# Patient Record
Sex: MALE | Race: WHITE | Employment: FULL TIME | URBAN - METROPOLITAN AREA
[De-identification: names, ages, dates, MRNs, and addresses within clinical notes are randomized per-mention and may not be internally consistent; named-entity substitution may affect disease eponyms.]

---

## 2021-10-22 ENCOUNTER — APPOINTMENT (OUTPATIENT)
Dept: GENERAL RADIOLOGY | Age: 21
End: 2021-10-22

## 2021-10-22 ENCOUNTER — HOSPITAL ENCOUNTER (EMERGENCY)
Age: 21
Discharge: HOME | End: 2021-10-22
Attending: EMERGENCY MEDICINE

## 2021-10-22 VITALS
HEIGHT: 77 IN | DIASTOLIC BLOOD PRESSURE: 67 MMHG | TEMPERATURE: 97.5 F | SYSTOLIC BLOOD PRESSURE: 126 MMHG | BODY MASS INDEX: 19.48 KG/M2 | HEART RATE: 81 BPM | RESPIRATION RATE: 16 BRPM | OXYGEN SATURATION: 100 % | WEIGHT: 165 LBS

## 2021-10-22 DIAGNOSIS — Z23 COVID-19 VACCINE SERIES STARTED: ICD-10-CM

## 2021-10-22 DIAGNOSIS — R11.2 NON-INTRACTABLE VOMITING WITH NAUSEA, UNSPECIFIED VOMITING TYPE: Primary | ICD-10-CM

## 2021-10-22 LAB
A/G RATIO: 1.4 (ref 1.1–2.2)
ALBUMIN SERPL-MCNC: 5.1 G/DL (ref 3.4–5)
ALP BLD-CCNC: 124 U/L (ref 40–129)
ALT SERPL-CCNC: 17 U/L (ref 10–40)
ANION GAP SERPL CALCULATED.3IONS-SCNC: 12 MMOL/L (ref 3–16)
AST SERPL-CCNC: 24 U/L (ref 15–37)
BASOPHILS ABSOLUTE: 0 K/UL (ref 0–0.2)
BASOPHILS RELATIVE PERCENT: 0.1 %
BILIRUB SERPL-MCNC: 0.6 MG/DL (ref 0–1)
BUN BLDV-MCNC: 11 MG/DL (ref 7–20)
CALCIUM SERPL-MCNC: 10.5 MG/DL (ref 8.3–10.6)
CHLORIDE BLD-SCNC: 101 MMOL/L (ref 99–110)
CO2: 26 MMOL/L (ref 21–32)
CREAT SERPL-MCNC: 0.8 MG/DL (ref 0.9–1.3)
EOSINOPHILS ABSOLUTE: 0 K/UL (ref 0–0.6)
EOSINOPHILS RELATIVE PERCENT: 0.2 %
GFR AFRICAN AMERICAN: >60
GFR NON-AFRICAN AMERICAN: >60
GLOBULIN: 3.6 G/DL
GLUCOSE BLD-MCNC: 96 MG/DL (ref 70–99)
HCT VFR BLD CALC: 45.7 % (ref 40.5–52.5)
HEMOGLOBIN: 15.7 G/DL (ref 13.5–17.5)
INFLUENZA A: NOT DETECTED
INFLUENZA B: NOT DETECTED
LYMPHOCYTES ABSOLUTE: 1.8 K/UL (ref 1–5.1)
LYMPHOCYTES RELATIVE PERCENT: 17.1 %
MCH RBC QN AUTO: 30.4 PG (ref 26–34)
MCHC RBC AUTO-ENTMCNC: 34.3 G/DL (ref 31–36)
MCV RBC AUTO: 88.7 FL (ref 80–100)
MONOCYTES ABSOLUTE: 0.6 K/UL (ref 0–1.3)
MONOCYTES RELATIVE PERCENT: 5.8 %
NEUTROPHILS ABSOLUTE: 8 K/UL (ref 1.7–7.7)
NEUTROPHILS RELATIVE PERCENT: 76.8 %
PDW BLD-RTO: 12.9 % (ref 12.4–15.4)
PLATELET # BLD: 222 K/UL (ref 135–450)
PMV BLD AUTO: 7 FL (ref 5–10.5)
POTASSIUM REFLEX MAGNESIUM: 4.5 MMOL/L (ref 3.5–5.1)
RBC # BLD: 5.15 M/UL (ref 4.2–5.9)
SARS-COV-2 RNA, RT PCR: NOT DETECTED
SODIUM BLD-SCNC: 139 MMOL/L (ref 136–145)
TOTAL PROTEIN: 8.7 G/DL (ref 6.4–8.2)
TROPONIN: <0.01 NG/ML
WBC # BLD: 10.4 K/UL (ref 4–11)

## 2021-10-22 PROCEDURE — 96374 THER/PROPH/DIAG INJ IV PUSH: CPT

## 2021-10-22 PROCEDURE — 93005 ELECTROCARDIOGRAM TRACING: CPT | Performed by: EMERGENCY MEDICINE

## 2021-10-22 PROCEDURE — 2580000003 HC RX 258: Performed by: NURSE PRACTITIONER

## 2021-10-22 PROCEDURE — 87636 SARSCOV2 & INF A&B AMP PRB: CPT

## 2021-10-22 PROCEDURE — 80053 COMPREHEN METABOLIC PANEL: CPT

## 2021-10-22 PROCEDURE — 84484 ASSAY OF TROPONIN QUANT: CPT

## 2021-10-22 PROCEDURE — 99284 EMERGENCY DEPT VISIT MOD MDM: CPT

## 2021-10-22 PROCEDURE — 71046 X-RAY EXAM CHEST 2 VIEWS: CPT

## 2021-10-22 PROCEDURE — 85025 COMPLETE CBC W/AUTO DIFF WBC: CPT

## 2021-10-22 PROCEDURE — 6360000002 HC RX W HCPCS: Performed by: NURSE PRACTITIONER

## 2021-10-22 RX ORDER — ONDANSETRON 2 MG/ML
4 INJECTION INTRAMUSCULAR; INTRAVENOUS ONCE
Status: COMPLETED | OUTPATIENT
Start: 2021-10-22 | End: 2021-10-22

## 2021-10-22 RX ORDER — ONDANSETRON 4 MG/1
4 TABLET, FILM COATED ORAL EVERY 8 HOURS PRN
Qty: 20 TABLET | Refills: 0 | Status: SHIPPED | OUTPATIENT
Start: 2021-10-22 | End: 2022-05-06

## 2021-10-22 RX ORDER — 0.9 % SODIUM CHLORIDE 0.9 %
2000 INTRAVENOUS SOLUTION INTRAVENOUS ONCE
Status: COMPLETED | OUTPATIENT
Start: 2021-10-22 | End: 2021-10-22

## 2021-10-22 RX ADMIN — ONDANSETRON HYDROCHLORIDE 4 MG: 2 INJECTION, SOLUTION INTRAMUSCULAR; INTRAVENOUS at 20:59

## 2021-10-22 RX ADMIN — SODIUM CHLORIDE 2000 ML: 9 INJECTION, SOLUTION INTRAVENOUS at 20:58

## 2021-10-22 ASSESSMENT — PAIN DESCRIPTION - LOCATION: LOCATION: CHEST

## 2021-10-22 ASSESSMENT — ENCOUNTER SYMPTOMS
SORE THROAT: 0
VOMITING: 1
NAUSEA: 1
RHINORRHEA: 0
SHORTNESS OF BREATH: 1
COLOR CHANGE: 0
ABDOMINAL PAIN: 1
COUGH: 1

## 2021-10-22 ASSESSMENT — PAIN DESCRIPTION - FREQUENCY: FREQUENCY: INTERMITTENT

## 2021-10-22 ASSESSMENT — PAIN DESCRIPTION - DESCRIPTORS: DESCRIPTORS: PRESSURE;BURNING

## 2021-10-22 ASSESSMENT — PAIN DESCRIPTION - PAIN TYPE: TYPE: ACUTE PAIN

## 2021-10-22 ASSESSMENT — PAIN SCALES - GENERAL: PAINLEVEL_OUTOF10: 10

## 2021-10-22 ASSESSMENT — PAIN DESCRIPTION - ORIENTATION: ORIENTATION: MID

## 2021-10-22 NOTE — ED PROVIDER NOTES
I did not perform an evaluation of Jony Darden but was asked to review his EKG. EKG interpreted by myself.    Rate: normal  Rhythm: NSR  Axis: normal  Intervals: within normal limits  ST Segments: no acute abnormality  T waves: no acute abnormality  Comparison: no prior   Impression: NSR with no acute abnormality        Jolene Pike MD  10/22/21 2540

## 2021-10-23 LAB
EKG ATRIAL RATE: 87 BPM
EKG DIAGNOSIS: NORMAL
EKG P AXIS: 73 DEGREES
EKG P-R INTERVAL: 142 MS
EKG Q-T INTERVAL: 320 MS
EKG QRS DURATION: 84 MS
EKG QTC CALCULATION (BAZETT): 385 MS
EKG R AXIS: 75 DEGREES
EKG T AXIS: 62 DEGREES
EKG VENTRICULAR RATE: 87 BPM

## 2021-10-23 PROCEDURE — 93010 ELECTROCARDIOGRAM REPORT: CPT | Performed by: INTERNAL MEDICINE

## 2021-10-23 NOTE — ED PROVIDER NOTES
Negative for agitation. All other systems reviewed and are negative. Positives and Pertinent negatives as per HPI. Except as noted above in the ROS, all other systems were reviewed and negative. PAST MEDICAL HISTORY   History reviewed. No pertinent past medical history. SURGICAL HISTORY     History reviewed. No pertinent surgical history. CURRENT MEDICATIONS       Discharge Medication List as of 10/22/2021 10:34 PM            ALLERGIES     Mucinex d [pseudoephedrine-guaifenesin er], Amoxicillin, Penicillins, and Robitussin severe cgh-sr thrt [acetaminophen-dm]    FAMILY HISTORY     History reviewed. No pertinent family history. SOCIAL HISTORY       Social History     Socioeconomic History    Marital status: Single     Spouse name: None    Number of children: None    Years of education: None    Highest education level: None   Occupational History    None   Tobacco Use    Smoking status: Current Every Day Smoker     Packs/day: 1.00     Types: Cigarettes, E-Cigarettes    Smokeless tobacco: Never Used   Substance and Sexual Activity    Alcohol use: Yes     Comment: OCCASS    Drug use: Not Currently    Sexual activity: None   Other Topics Concern    None   Social History Narrative    None     Social Determinants of Health     Financial Resource Strain:     Difficulty of Paying Living Expenses:    Food Insecurity:     Worried About Running Out of Food in the Last Year:     Ran Out of Food in the Last Year:    Transportation Needs:     Lack of Transportation (Medical):      Lack of Transportation (Non-Medical):    Physical Activity:     Days of Exercise per Week:     Minutes of Exercise per Session:    Stress:     Feeling of Stress :    Social Connections:     Frequency of Communication with Friends and Family:     Frequency of Social Gatherings with Friends and Family:     Attends Judaism Services:     Active Member of Clubs or Organizations:     Attends Club or Organization Meetings:     Marital Status:    Intimate Partner Violence:     Fear of Current or Ex-Partner:     Emotionally Abused:     Physically Abused:     Sexually Abused:        SCREENINGS    [place] Coma Scale  Eye Opening: Spontaneous  Best Verbal Response: Oriented  Best Motor Response: Obeys commands  [place] Coma Scale Score: 15        PHYSICAL EXAM  (up to 7 for level 4, 8 or more for level 5)     ED Triage Vitals [10/22/21 1817]   BP Temp Temp Source Pulse Resp SpO2 Height Weight   (!) 142/85 97.5 °F (36.4 °C) Oral 85 18 99 % 6' 5\" (1.956 m) 165 lb (74.8 kg)       Physical Exam  Constitutional:       Appearance: He is well-developed. HENT:      Head: Normocephalic and atraumatic. Nose: Congestion present. Cardiovascular:      Rate and Rhythm: Normal rate. Pulmonary:      Effort: Pulmonary effort is normal. No respiratory distress. Breath sounds: Rales present. Abdominal:      General: There is no distension. Palpations: Abdomen is soft. Tenderness: There is generalized abdominal tenderness. Musculoskeletal:         General: Normal range of motion. Cervical back: Normal range of motion. Skin:     General: Skin is warm and dry. Neurological:      Mental Status: He is alert and oriented to person, place, and time.          DIAGNOSTIC RESULTS   LABS:    Labs Reviewed   CBC WITH AUTO DIFFERENTIAL - Abnormal; Notable for the following components:       Result Value    Neutrophils Absolute 8.0 (*)     All other components within normal limits    Narrative:     Performed at:  [facility] [address],  [place], [facility]   Phone [phone]   COMPREHENSIVE METABOLIC PANEL W/ REFLEX TO MG FOR LOW K - Abnormal; Notable for the following components:    CREATININE 0.8 (*)     Total Protein 8.7 (*)     Albumin 5.1 (*)     All other components within normal limits    Narrative:     Performed at:  [facility]  [address],  [place], [facility]   Phone [phone] & INFLUENZA COMBO    Narrative:     Performed at:  [facility]  [address],  [place], [facility]   Phone [phone]   TROPONIN    Narrative:     Performed at:  [facility]  [address],  [place], [facility]   Phone [phone]       All other labs werewithin normal range or not returned as of this dictation. EKG: All EKG's are interpreted by the Emergency Department Physician who either signs or Co-signs this chart in the absence of a cardiologist.  Please see their note for interpretation of EKG. RADIOLOGY:   Chest x-ray interpreted by radiologist for  FINDINGS:   The lungs are clear. There is no pleural effusion. The cardiomediastinal   silhouette is normal. There is no pneumothorax. Interpretation per the Radiologist below, if available at the time of this note:    XR CHEST (2 VW)   Final Result   No acute disease. No results found. PROCEDURES   Unless otherwise noted below, none     Procedures     CRITICAL CARE TIME   N/A    CONSULTS:  None      EMERGENCYDEPARTMENT COURSE and DIFFERENTIAL DIAGNOSIS/MDM:   Vitals:    Vitals:    10/22/21 1817 10/22/21 2007 10/22/21 2100 10/22/21 2200   BP: (!) 142/85 131/80 129/83 126/67   Pulse: 85 86 82 81   Resp: 18 16 16 16   Temp: 97.5 °F (36.4 °C)      TempSrc: Oral      SpO2: 99% 100% 100% 100%   Weight: 165 lb (74.8 kg)      Height: 6' 5\" (1.956 m)          Patient was given the following medications:  Medications   0.9 % sodium chloride bolus (0 mLs IntraVENous Stopped 10/22/21 2158)   ondansetron (ZOFRAN) injection 4 mg (4 mg IntraVENous Given 10/22/21 2059)       Patient was seen and evaluated by myself. Patient reports that he received the Covid vaccine on 10/17/2021. He reports that he woke up today not feeling well. He reports chills nausea and vomiting.   On exam he is awake and alert hemodynamically stable nontoxic in appearance. Lab has been reviewed and interpreted. Patient was provided with IV fluids and nausea medications. Patient was reevaluated and states that he does feel better. Patient was able to take oral intake without difficulty. Patient will be discharged with Zofran. Patient was encouraged to follow-up with her primary care doctor the next 2 days and return to the ED for worsening symptoms. Patient was ultimately discharged home with all questions answered. The patient tolerated their visit well. I have evaluated this patient. My supervising physician was available for consultation. The patient and / or the family were informed of the results of any tests, a time was given to answer questions, a plan was proposed and they agreed with plan. FINAL IMPRESSION      1. Non-intractable vomiting with nausea, unspecified vomiting type    2.  COVID-19 vaccine series started          DISPOSITION/PLAN   DISPOSITION Decision To Discharge 10/22/2021 10:22:34 PM      PATIENT REFERRED TO:  Deepak Jonybhavna  [phone]  Schedule an appointment as soon as possible for a visit in 1 week  to establish primary care    [facility] ED  [address] [facility]  [phone]    If symptoms worsen      DISCHARGE MEDICATIONS:  Discharge Medication List as of 10/22/2021 10:34 PM      START taking these medications    Details   ondansetron (ZOFRAN) 4 MG tablet Take 1 tablet by mouth every 8 hours as needed for Nausea, Disp-20 tablet, R-0Print             DISCONTINUED MEDICATIONS:  Discharge Medication List as of 10/22/2021 10:34 PM                 (Please note that portions of this note were completed with a voice recognition program.  Efforts were made to edit the dictations but occasionally words are mis-transcribed.)    MAXIME Lay CNP (electronically signed)         MAXIME Lay CNP  10/25/21 6780

## 2022-05-06 ENCOUNTER — HOSPITAL ENCOUNTER (EMERGENCY)
Age: 22
Discharge: HOME | End: 2022-05-07
Attending: EMERGENCY MEDICINE

## 2022-05-06 ENCOUNTER — APPOINTMENT (OUTPATIENT)
Dept: GENERAL RADIOLOGY | Age: 22
End: 2022-05-06

## 2022-05-06 DIAGNOSIS — R07.9 CHEST PAIN, UNSPECIFIED TYPE: Primary | ICD-10-CM

## 2022-05-06 DIAGNOSIS — Z86.59 HISTORY OF RECENT STRESSFUL LIFE EVENT: ICD-10-CM

## 2022-05-06 DIAGNOSIS — J02.9 SORE THROAT: ICD-10-CM

## 2022-05-06 DIAGNOSIS — R11.0 NAUSEA: ICD-10-CM

## 2022-05-06 DIAGNOSIS — R51.9 ACUTE NONINTRACTABLE HEADACHE, UNSPECIFIED HEADACHE TYPE: ICD-10-CM

## 2022-05-06 DIAGNOSIS — Z20.822 EXPOSURE TO COVID-19 VIRUS: ICD-10-CM

## 2022-05-06 DIAGNOSIS — R63.0 DECREASED APPETITE: ICD-10-CM

## 2022-05-06 DIAGNOSIS — R00.1 SINUS BRADYCARDIA: ICD-10-CM

## 2022-05-06 DIAGNOSIS — R00.2 PALPITATIONS: ICD-10-CM

## 2022-05-06 PROCEDURE — 71045 X-RAY EXAM CHEST 1 VIEW: CPT

## 2022-05-06 PROCEDURE — 93005 ELECTROCARDIOGRAM TRACING: CPT | Performed by: EMERGENCY MEDICINE

## 2022-05-06 PROCEDURE — 99285 EMERGENCY DEPT VISIT HI MDM: CPT

## 2022-05-06 ASSESSMENT — PAIN DESCRIPTION - ORIENTATION: ORIENTATION: MID

## 2022-05-06 ASSESSMENT — PAIN - FUNCTIONAL ASSESSMENT: PAIN_FUNCTIONAL_ASSESSMENT: 0-10

## 2022-05-06 ASSESSMENT — PAIN DESCRIPTION - LOCATION: LOCATION: CHEST

## 2022-05-06 ASSESSMENT — PAIN DESCRIPTION - PAIN TYPE: TYPE: ACUTE PAIN

## 2022-05-06 ASSESSMENT — PAIN SCALES - GENERAL: PAINLEVEL_OUTOF10: 6

## 2022-05-06 ASSESSMENT — PAIN DESCRIPTION - FREQUENCY: FREQUENCY: CONTINUOUS

## 2022-05-06 ASSESSMENT — PAIN DESCRIPTION - DESCRIPTORS: DESCRIPTORS: PRESSURE

## 2022-05-07 VITALS
OXYGEN SATURATION: 99 % | TEMPERATURE: 98.3 F | DIASTOLIC BLOOD PRESSURE: 68 MMHG | BODY MASS INDEX: 15.04 KG/M2 | WEIGHT: 130 LBS | SYSTOLIC BLOOD PRESSURE: 135 MMHG | HEIGHT: 78 IN | HEART RATE: 64 BPM | RESPIRATION RATE: 16 BRPM

## 2022-05-07 LAB
A/G RATIO: 1.4 (ref 1.1–2.2)
ALBUMIN SERPL-MCNC: 5.2 G/DL (ref 3.4–5)
ALP BLD-CCNC: 128 U/L (ref 40–129)
ALT SERPL-CCNC: 24 U/L (ref 10–40)
ANION GAP SERPL CALCULATED.3IONS-SCNC: 14 MMOL/L (ref 3–16)
AST SERPL-CCNC: 17 U/L (ref 15–37)
BASOPHILS ABSOLUTE: 0 K/UL (ref 0–0.2)
BASOPHILS RELATIVE PERCENT: 0.4 %
BILIRUB SERPL-MCNC: 0.5 MG/DL (ref 0–1)
BILIRUBIN URINE: NEGATIVE
BLOOD, URINE: NEGATIVE
BUN BLDV-MCNC: 6 MG/DL (ref 7–20)
CALCIUM SERPL-MCNC: 10.2 MG/DL (ref 8.3–10.6)
CHLORIDE BLD-SCNC: 101 MMOL/L (ref 99–110)
CLARITY: CLEAR
CO2: 26 MMOL/L (ref 21–32)
COLOR: YELLOW
CREAT SERPL-MCNC: 0.9 MG/DL (ref 0.9–1.3)
EKG ATRIAL RATE: 57 BPM
EKG DIAGNOSIS: NORMAL
EKG P AXIS: 52 DEGREES
EKG P-R INTERVAL: 136 MS
EKG Q-T INTERVAL: 366 MS
EKG QRS DURATION: 90 MS
EKG QTC CALCULATION (BAZETT): 356 MS
EKG R AXIS: 71 DEGREES
EKG T AXIS: 40 DEGREES
EKG VENTRICULAR RATE: 57 BPM
EOSINOPHILS ABSOLUTE: 0 K/UL (ref 0–0.6)
EOSINOPHILS RELATIVE PERCENT: 0.6 %
GFR AFRICAN AMERICAN: >60
GFR NON-AFRICAN AMERICAN: >60
GLUCOSE BLD-MCNC: 98 MG/DL (ref 70–99)
GLUCOSE URINE: NEGATIVE MG/DL
HCT VFR BLD CALC: 48.2 % (ref 40.5–52.5)
HEMOGLOBIN: 16.8 G/DL (ref 13.5–17.5)
HIV AG/AB: NORMAL
HIV ANTIGEN: NORMAL
HIV-1 ANTIBODY: NORMAL
HIV-2 AB: NORMAL
KETONES, URINE: NEGATIVE MG/DL
LEUKOCYTE ESTERASE, URINE: NEGATIVE
LIPASE: 24 U/L (ref 13–60)
LYMPHOCYTES ABSOLUTE: 3.8 K/UL (ref 1–5.1)
LYMPHOCYTES RELATIVE PERCENT: 46.2 %
MCH RBC QN AUTO: 29.5 PG (ref 26–34)
MCHC RBC AUTO-ENTMCNC: 34.8 G/DL (ref 31–36)
MCV RBC AUTO: 84.7 FL (ref 80–100)
MICROSCOPIC EXAMINATION: NORMAL
MONO TEST: NEGATIVE
MONOCYTES ABSOLUTE: 0.6 K/UL (ref 0–1.3)
MONOCYTES RELATIVE PERCENT: 6.7 %
NEUTROPHILS ABSOLUTE: 3.8 K/UL (ref 1.7–7.7)
NEUTROPHILS RELATIVE PERCENT: 46.1 %
NITRITE, URINE: NEGATIVE
PDW BLD-RTO: 13.8 % (ref 12.4–15.4)
PH UA: 6 (ref 5–8)
PLATELET # BLD: 246 K/UL (ref 135–450)
PMV BLD AUTO: 6.5 FL (ref 5–10.5)
POTASSIUM REFLEX MAGNESIUM: 4 MMOL/L (ref 3.5–5.1)
PROTEIN UA: NEGATIVE MG/DL
RAPID INFLUENZA  B AGN: NEGATIVE
RAPID INFLUENZA A AGN: NEGATIVE
RBC # BLD: 5.69 M/UL (ref 4.2–5.9)
S PYO AG THROAT QL: NEGATIVE
SARS-COV-2, NAAT: NOT DETECTED
SODIUM BLD-SCNC: 141 MMOL/L (ref 136–145)
SPECIFIC GRAVITY UA: <=1.005 (ref 1–1.03)
T4 FREE: 1.2 NG/DL (ref 0.9–1.8)
TOTAL PROTEIN: 8.8 G/DL (ref 6.4–8.2)
TSH SERPL DL<=0.05 MIU/L-ACNC: 1.71 UIU/ML (ref 0.27–4.2)
URINE REFLEX TO CULTURE: NORMAL
URINE TYPE: NORMAL
UROBILINOGEN, URINE: 0.2 E.U./DL
WBC # BLD: 8.2 K/UL (ref 4–11)

## 2022-05-07 PROCEDURE — 84443 ASSAY THYROID STIM HORMONE: CPT

## 2022-05-07 PROCEDURE — 86701 HIV-1ANTIBODY: CPT

## 2022-05-07 PROCEDURE — 83690 ASSAY OF LIPASE: CPT

## 2022-05-07 PROCEDURE — 36415 COLL VENOUS BLD VENIPUNCTURE: CPT

## 2022-05-07 PROCEDURE — 84439 ASSAY OF FREE THYROXINE: CPT

## 2022-05-07 PROCEDURE — 87804 INFLUENZA ASSAY W/OPTIC: CPT

## 2022-05-07 PROCEDURE — 87635 SARS-COV-2 COVID-19 AMP PRB: CPT

## 2022-05-07 PROCEDURE — 87081 CULTURE SCREEN ONLY: CPT

## 2022-05-07 PROCEDURE — 86308 HETEROPHILE ANTIBODY SCREEN: CPT

## 2022-05-07 PROCEDURE — 85025 COMPLETE CBC W/AUTO DIFF WBC: CPT

## 2022-05-07 PROCEDURE — 93010 ELECTROCARDIOGRAM REPORT: CPT | Performed by: INTERNAL MEDICINE

## 2022-05-07 PROCEDURE — 6370000000 HC RX 637 (ALT 250 FOR IP): Performed by: EMERGENCY MEDICINE

## 2022-05-07 PROCEDURE — 87880 STREP A ASSAY W/OPTIC: CPT

## 2022-05-07 PROCEDURE — 81003 URINALYSIS AUTO W/O SCOPE: CPT

## 2022-05-07 PROCEDURE — 80053 COMPREHEN METABOLIC PANEL: CPT

## 2022-05-07 PROCEDURE — 87390 HIV-1 AG IA: CPT

## 2022-05-07 PROCEDURE — 86702 HIV-2 ANTIBODY: CPT

## 2022-05-07 RX ORDER — NAPROXEN 250 MG/1
500 TABLET ORAL ONCE
Status: COMPLETED | OUTPATIENT
Start: 2022-05-07 | End: 2022-05-07

## 2022-05-07 RX ORDER — ONDANSETRON 4 MG/1
4 TABLET, ORALLY DISINTEGRATING ORAL ONCE
Status: COMPLETED | OUTPATIENT
Start: 2022-05-07 | End: 2022-05-07

## 2022-05-07 RX ADMIN — ONDANSETRON 4 MG: 4 TABLET, ORALLY DISINTEGRATING ORAL at 00:32

## 2022-05-07 RX ADMIN — NAPROXEN 500 MG: 250 TABLET ORAL at 00:32

## 2022-05-07 ASSESSMENT — PAIN SCALES - GENERAL: PAINLEVEL_OUTOF10: 5

## 2022-05-07 ASSESSMENT — PAIN - FUNCTIONAL ASSESSMENT
PAIN_FUNCTIONAL_ASSESSMENT: NONE - DENIES PAIN
PAIN_FUNCTIONAL_ASSESSMENT: NONE - DENIES PAIN

## 2022-05-07 NOTE — ED PROVIDER NOTES
CHIEF COMPLAINT  Chest Pain (pt has pain in mid chest non radiating, constant since started 1 hr ago. Pt reports last ate er0023.)      HISTORY OF PRESENT ILLNESS  Fartun Mabry is a 25 y.o. male presents to the ED with chest pain, midsternal, nonradiating, started about an hour prior to arrival, waxing and waning, a little associated shortness of breath, also felt like his heart was beating really hard, palpitations, this has improved, no significant cough/congestion, little sore/scratchy throat, no earache, reports exposure to his mother who recently got diagnosed with COVID, states he has had no increased stressors at this evening, but yesterday his sister was taken to [facility] ER after he had to go get her to stop her from jumping off a bridge, she was planning to commit suicide, this was a notable stressor for him, he has been having some nausea, decreased appetite for several months, has lost about 30 pounds, unsure of the cause, no new medications, had not been feeling sick otherwise until the past couple days, he is a smoker, reports marijuana use about once a month, and alcohol use once a week on Wednesdays, he had eaten around 5 PM earlier this evening, has not noticed any difference in symptoms with foods, no vomiting, no bowel changes or diarrhea. Boyfriend is here with him, denies any dysuria/hematuria/urgency/frequency, no concern for STDs/immune deficiencies, he also has a bitemporal headache, had not taken anything this evening for pain, nor for nausea, intermittent abdominal discomfort, no history of DVT or PE, no history of heart disease, no other known sick contacts, no other complaints, modifying factors or associated symptoms. I have reviewed the following from the nursing documentation. Past Medical History:   Diagnosis Date    ADHD     Childhood asthma      History reviewed. No pertinent surgical history. History reviewed. No pertinent family history.   Social History Socioeconomic History    Marital status: Single     Spouse name: Not on file    Number of children: Not on file    Years of education: Not on file    Highest education level: Not on file   Occupational History    Not on file   Tobacco Use    Smoking status: Current Every Day Smoker     Packs/day: 1.00     Types: Cigarettes, E-Cigarettes    Smokeless tobacco: Never Used   Vaping Use    Vaping Use: Not on file   Substance and Sexual Activity    Alcohol use: Yes     Comment: 1-week    Drug use: Not Currently    Sexual activity: Not on file   Other Topics Concern    Not on file   Social History Narrative    Not on file     Social Determinants of Health     Financial Resource Strain:     Difficulty of Paying Living Expenses: Not on file   Food Insecurity:     Worried About Running Out of Food in the Last Year: Not on file    Kt of Food in the Last Year: Not on file   Transportation Needs:     Lack of Transportation (Medical): Not on file    Lack of Transportation (Non-Medical):  Not on file   Physical Activity:     Days of Exercise per Week: Not on file    Minutes of Exercise per Session: Not on file   Stress:     Feeling of Stress : Not on file   Social Connections:     Frequency of Communication with Friends and Family: Not on file    Frequency of Social Gatherings with Friends and Family: Not on file    Attends Evangelical Services: Not on file    Active Member of [address] [place] [address] or Organizations: Not on file    Attends Club or Organization Meetings: Not on file    Marital Status: Not on file   Intimate Partner Violence:     Fear of Current or Ex-Partner: Not on file    Emotionally Abused: Not on file    Physically Abused: Not on file    Sexually Abused: Not on file   Housing Stability:     Unable to Pay for Housing in the Last Year: Not on file    Number of [place] in the Last Year: Not on file    Unstable Housing in the Last Year: Not on file     No current facility-administered medications for this encounter. No current outpatient medications on file. Allergies   Allergen Reactions    Mucinex D [Pseudoephedrine-Guaifenesin Er] Hives    Amoxicillin Hives, Nausea And Vomiting and Swelling    Penicillins Hives and Nausea And Vomiting    Robitussin Severe Cgh-Sr Thrt [Acetaminophen-Dm] Hives and Nausea And Vomiting       REVIEW OF SYSTEMS  10 systems reviewed, pertinent positives per HPI otherwise noted to be negative. PHYSICAL EXAM  /68   Pulse 64   Temp 98.3 °F (36.8 °C) (Oral)   Resp 16   Ht 6' 6\" (1.981 m)   Wt 130 lb (59 kg)   SpO2 99%   BMI 15.02 kg/m²   GENERAL APPEARANCE: Awake and alert. Cooperative. No acute distress, very thin  HEAD: Normocephalic. Atraumatic. EYES: PERRL. EOM's grossly intact. ENT: Mucous membranes are moist. Large gauge earrings, TMs without bulging/erythema/edema bilaterally, mild peritonsillar erythema, no exudates, midline uvula, airway patent, no stridor  NECK: Supple. No rigidity, no adenopathy, normal range of motion, no edema, he does have brownish, subacute appearing suction type ecchymosis in multiple areas of the anterior neck  HEART: RRR. No murmurs  LUNGS: Respirations unlabored. Lungs are clear to auscultation bilaterally. No wheezes crackles or rhonchi  ABDOMEN: Soft. Non-distended. Slight left lower tenderness to palpation, negative Rovsing sign, negative McBurney's point tenderness, negative Gardiner sign. No guarding or rebound. Normal bowel sounds. Umbilical piercing, trace erythema, no purulence, no significant induration/edema/fluctuance  EXTREMITIES: No peripheral edema. Moves all extremities equally. no calf tenderness  SKIN: Warm and dry. No acute rashes. NEUROLOGICAL: Alert and oriented. No gross facial drooping. Normal speech, steady gait, strength and sensation grossly intact, cranial nerves II through XII grossly intact  PSYCHIATRIC: Normal mood and affect.     LABS  I have reviewed all labs for this visit.    Results for orders placed or performed during the hospital encounter of 05/06/22   Strep Screen Group A Throat    Specimen: Throat   Result Value Ref Range    Rapid Strep A Screen Negative Negative   Rapid influenza A/B antigens    Specimen: Nasopharyngeal   Result Value Ref Range    Rapid Influenza A Ag Negative Negative    Rapid Influenza B Ag Negative Negative   COVID-19, Rapid    Specimen: Nasopharyngeal Swab   Result Value Ref Range    SARS-CoV-2, NAAT Not Detected Not Detected   CBC with Auto Differential   Result Value Ref Range    WBC 8.2 4.0 - 11.0 K/uL    RBC 5.69 4.20 - 5.90 M/uL    Hemoglobin 16.8 13.5 - 17.5 g/dL    Hematocrit 48.2 40.5 - 52.5 %    MCV 84.7 80.0 - 100.0 fL    MCH 29.5 26.0 - 34.0 pg    MCHC 34.8 31.0 - 36.0 g/dL    RDW 13.8 12.4 - 15.4 %    Platelets 594 148 - 481 K/uL    MPV 6.5 5.0 - 10.5 fL    Neutrophils % 46.1 %    Lymphocytes % 46.2 %    Monocytes % 6.7 %    Eosinophils % 0.6 %    Basophils % 0.4 %    Neutrophils Absolute 3.8 1.7 - 7.7 K/uL    Lymphocytes Absolute 3.8 1.0 - 5.1 K/uL    Monocytes Absolute 0.6 0.0 - 1.3 K/uL    Eosinophils Absolute 0.0 0.0 - 0.6 K/uL    Basophils Absolute 0.0 0.0 - 0.2 K/uL   Comprehensive Metabolic Panel w/ Reflex to MG   Result Value Ref Range    Sodium 141 136 - 145 mmol/L    Potassium reflex Magnesium 4.0 3.5 - 5.1 mmol/L    Chloride 101 99 - 110 mmol/L    CO2 26 21 - 32 mmol/L    Anion Gap 14 3 - 16    Glucose 98 70 - 99 mg/dL    BUN 6 (L) 7 - 20 mg/dL    CREATININE 0.9 0.9 - 1.3 mg/dL    GFR Non-African American >60 >60    GFR African American >60 >60    Calcium 10.2 8.3 - 10.6 mg/dL    Total Protein 8.8 (H) 6.4 - 8.2 g/dL    Albumin 5.2 (H) 3.4 - 5.0 g/dL    Albumin/Globulin Ratio 1.4 1.1 - 2.2    Total Bilirubin 0.5 0.0 - 1.0 mg/dL    Alkaline Phosphatase 128 40 - 129 U/L    ALT 24 10 - 40 U/L    AST 17 15 - 37 U/L   Lipase   Result Value Ref Range    Lipase 24.0 13.0 - 60.0 U/L   Urinalysis with Reflex to Culture    Specimen: Urine Result Value Ref Range    Color, UA Yellow Straw/Yellow    Clarity, UA Clear Clear    Glucose, Ur Negative Negative mg/dL    Bilirubin Urine Negative Negative    Ketones, Urine Negative Negative mg/dL    Specific Gravity, UA <=1.005 1.005 - 1.030    Blood, Urine Negative Negative    pH, UA 6.0 5.0 - 8.0    Protein, UA Negative Negative mg/dL    Urobilinogen, Urine 0.2 <2.0 E.U./dL    Nitrite, Urine Negative Negative    Leukocyte Esterase, Urine Negative Negative    Microscopic Examination Not Indicated     Urine Type NotGiven     Urine Reflex to Culture Not Indicated    Mononucleosis screen   Result Value Ref Range    Mono Test Negative Negative         RADIOLOGY  XR CHEST PORTABLE    Result Date: 5/7/2022  EXAMINATION: ONE XRAY VIEW OF THE CHEST 5/7/2022 12:02 am COMPARISON: 10/22/2021 HISTORY: ORDERING SYSTEM PROVIDED HISTORY: chest pain TECHNOLOGIST PROVIDED HISTORY: Reason for exam:->chest pain Reason for Exam: chest pain FINDINGS: There is no consolidation, pleural effusion, or pneumothorax. Cardiac silhouette is not enlarged and there is no pulmonary vascular congestion. Mediastinum and di are within normal limits. Bony thorax is unremarkable. No acute cardiopulmonary process. PERC  Applicable in this patient who has low clinical suspicion for pulmonary embolism. Age < 48years old: Yes  Heart rate < 100 bpm: Yes  Oxygen saturation > 95%: Yes  Hemoptysis: No  Exogenous estrogen use: No  Prior history of DVT or PE: No  Unilateral leg swelling: No  Surgery or significant trauma in the past 4 weeks: No   Based on the above, PE can effectively be ruled out without further testing. HEART SCORE:    History: +0 for low suspicion  EKG: +0 for normal EKG   Age: [de-identified] for age <45 years  Risk factors (includes HLD, HTN, DM, tobacco use, obesity, and +FHx): +1 for 1-2 risk factors  Initial troponin: +0 for negative troponin    Heart score: 1.   This falls under the following category: Score of 0-3, which indicates a very low risk for major adverse cardiac event and supports early discharge      ED COURSE/MDM  Patient seen and evaluated. Old records reviewed. Labs and imaging reviewed and results discussed with patient. 59-year-old male with chest pain, suspect this is more likely due to anxiety, low suspicion for ACS/PE/dissection, normal troponin, he has also had some other nonspecific symptoms, performed labs and chest x-ray, no acute process on chest x-ray, labs unremarkable, strep, flu, COVID-negative, explained that it may just be too early in the course of symptoms since he did have COVID exposure, he should get retested if still having symptoms or fevers develop, explained we would send the strep for culture and he will be notified if antibiotics are necessary, he did not have an exudative tonsillitis, normal vitals, I did send his blood work for HIV with his consent, due to unintended weight loss, decreased appetite, explained that I would send thyroid function testing and he needs to get a primary care doctor to follow-up on these, placed referral, he is able to tolerate p.o., declined need for any medication for home, given Naprosyn for pain/headache, low suspicion for RPA/PTA/meningitis/encephalitis/pneumonia/sepsis, was playing/texting on his phone, no acute distress throughout the visit, he was bradycardic throughout much of the ER visit, sinus bradycardia on the EKG, no acute ischemic changes, but he reports his heart rate was normally fast, so I encouraged cardiology follow-up, and given the palpitations explained the may want to do a 30-day event monitor or Holter monitor, nonperitoneal abdominal exam, low suspicion for acute appendicitis/cholecystitis/obstruction, strict return precautions given, all questions answered, will return if any worsening symptoms or new concerns, see AVS for further discharge information, patient verbalized understanding of plan, felt comfortable going home.       Orders Placed This Encounter   Procedures    Strep Screen Group A Throat    Rapid influenza A/B antigens    COVID-19, Rapid    Culture, Beta Strep Confirm Plates    XR CHEST PORTABLE    CBC with Auto Differential    Comprehensive Metabolic Panel w/ Reflex to MG    Lipase    Urinalysis with Reflex to Culture    HIV Screen    Mononucleosis screen    TSH    T4, Free    Referral for No Primary Care Physician - Urgent    EKG 12 Lead     Orders Placed This Encounter   Medications    ondansetron (ZOFRAN-ODT) disintegrating tablet 4 mg    naproxen (NAPROSYN) tablet 500 mg     ED Course as of 05/07/22 0721   Sat May 07, 2022   0146 EKG 12 Lead  EKG interpretation by me: Sinus bradycardia, rate 57, some LVH criteria, but no significant left axis deviation, he is tall and thin, likely normal variant, no ST segment or T wave changes indicative of acute ischemia [SY]      ED Course User Index  [SY] Aldo Abrams DO       CLINICAL IMPRESSION  1. Chest pain, unspecified type    2. Palpitations    3. Sinus bradycardia    4. Acute nonintractable headache, unspecified headache type    5. Exposure to COVID-19 virus    6. Sore throat    7. Decreased appetite    8. Nausea    9. History of recent stressful life event        Blood pressure 135/68, pulse 64, temperature 98.3 °F (36.8 °C), temperature source Oral, resp. rate 16, height 6' 6\" (1.981 m), weight 130 lb (59 kg), SpO2 99 %. DISPOSITION  Neno Madden was discharged to home in stable condition.                    Aldo Abrams DO  05/07/22 8560

## 2022-05-09 LAB — S PYO THROAT QL CULT: NORMAL

## 2023-01-31 ENCOUNTER — OFFICE VISIT (OUTPATIENT)
Dept: FAMILY MEDICINE CLINIC | Age: 23
End: 2023-01-31
Payer: MEDICAID

## 2023-01-31 VITALS
HEART RATE: 116 BPM | OXYGEN SATURATION: 97 % | DIASTOLIC BLOOD PRESSURE: 82 MMHG | RESPIRATION RATE: 16 BRPM | SYSTOLIC BLOOD PRESSURE: 112 MMHG | BODY MASS INDEX: 17.31 KG/M2 | HEIGHT: 75 IN | WEIGHT: 139.2 LBS

## 2023-01-31 DIAGNOSIS — R11.2 NAUSEA AND VOMITING, UNSPECIFIED VOMITING TYPE: ICD-10-CM

## 2023-01-31 DIAGNOSIS — K92.1 BLOOD IN STOOL: ICD-10-CM

## 2023-01-31 DIAGNOSIS — Z77.011 HISTORY OF LEAD EXPOSURE: ICD-10-CM

## 2023-01-31 DIAGNOSIS — R62.7 POOR WEIGHT GAIN IN ADULT: ICD-10-CM

## 2023-01-31 DIAGNOSIS — Z72.0 TOBACCO ABUSE: ICD-10-CM

## 2023-01-31 DIAGNOSIS — Z76.89 ENCOUNTER TO ESTABLISH CARE: Primary | ICD-10-CM

## 2023-01-31 DIAGNOSIS — R31.9 HEMATURIA, UNSPECIFIED TYPE: ICD-10-CM

## 2023-01-31 DIAGNOSIS — R30.0 DYSURIA: ICD-10-CM

## 2023-01-31 DIAGNOSIS — Z83.79 FAMILY HISTORY OF CROHN'S DISEASE: ICD-10-CM

## 2023-01-31 LAB
ALBUMIN SERPL-MCNC: 5.3 G/DL (ref 3.4–5)
ALBUMIN/GLOB SERPL: 1.9 {RATIO} (ref 1.1–2.2)
ALP BLD-CCNC: 112 U/L (ref 40–129)
ALT SERPL-CCNC: 27 U/L (ref 10–40)
ANION GAP SERPL CALCULATED.3IONS-SCNC: 11 MMOL/L (ref 3–16)
AST SERPL-CCNC: 24 U/L (ref 15–37)
BASOPHILS # BLD: 0 K/UL (ref 0–0.2)
BASOPHILS NFR BLD: 0.3 %
BILIRUB SERPL-MCNC: <0.2 MG/DL (ref 0–1)
BILIRUBIN, POC: NEGATIVE
BLOOD URINE, POC: NEGATIVE
BUN SERPL-MCNC: 12 MG/DL (ref 7–20)
CALCIUM SERPL-MCNC: 10.6 MG/DL (ref 8.3–10.6)
CHLORIDE SERPL-SCNC: 102 MMOL/L (ref 99–110)
CLARITY, POC: NORMAL
CO2 SERPL-SCNC: 27 MMOL/L (ref 21–32)
COLOR, POC: NORMAL
CREAT SERPL-MCNC: 0.8 MG/DL (ref 0.9–1.3)
EOSINOPHIL # BLD: 0 K/UL (ref 0–0.6)
EOSINOPHIL NFR BLD: 0.5 %
GFR SERPL CREATININE-BSD FRML MDRD: >60 ML/MIN/{1.73_M2}
GLUCOSE SERPL-MCNC: 100 MG/DL (ref 70–99)
GLUCOSE URINE, POC: NEGATIVE
HCT VFR BLD AUTO: 43.5 % (ref 40.5–52.5)
HGB BLD-MCNC: 14.7 G/DL (ref 13.5–17.5)
KETONES, POC: NEGATIVE
LEUKOCYTE EST, POC: NEGATIVE
LYMPHOCYTES # BLD: 2.2 K/UL (ref 1–5.1)
LYMPHOCYTES NFR BLD: 45.1 %
MCH RBC QN AUTO: 30.4 PG (ref 26–34)
MCHC RBC AUTO-ENTMCNC: 33.9 G/DL (ref 31–36)
MCV RBC AUTO: 89.7 FL (ref 80–100)
MONOCYTES # BLD: 0.3 K/UL (ref 0–1.3)
MONOCYTES NFR BLD: 7.3 %
NEUTROPHILS # BLD: 2.2 K/UL (ref 1.7–7.7)
NEUTROPHILS NFR BLD: 46.8 %
NITRITE, POC: NEGATIVE
PDW BLD-RTO: 12.7 % (ref 12.4–15.4)
PH, POC: 7
PLATELET # BLD AUTO: 207 K/UL (ref 135–450)
PMV BLD AUTO: 8.1 FL (ref 5–10.5)
POTASSIUM SERPL-SCNC: 4.3 MMOL/L (ref 3.5–5.1)
PROT SERPL-MCNC: 8.1 G/DL (ref 6.4–8.2)
PROTEIN, POC: NORMAL
RBC # BLD AUTO: 4.85 M/UL (ref 4.2–5.9)
SODIUM SERPL-SCNC: 140 MMOL/L (ref 136–145)
SPECIFIC GRAVITY, POC: 1.01
UROBILINOGEN, POC: 0.2
WBC # BLD AUTO: 4.8 K/UL (ref 4–11)

## 2023-01-31 PROCEDURE — G8427 DOCREV CUR MEDS BY ELIG CLIN: HCPCS | Performed by: STUDENT IN AN ORGANIZED HEALTH CARE EDUCATION/TRAINING PROGRAM

## 2023-01-31 PROCEDURE — 1036F TOBACCO NON-USER: CPT | Performed by: STUDENT IN AN ORGANIZED HEALTH CARE EDUCATION/TRAINING PROGRAM

## 2023-01-31 PROCEDURE — G8419 CALC BMI OUT NRM PARAM NOF/U: HCPCS | Performed by: STUDENT IN AN ORGANIZED HEALTH CARE EDUCATION/TRAINING PROGRAM

## 2023-01-31 PROCEDURE — G8484 FLU IMMUNIZE NO ADMIN: HCPCS | Performed by: STUDENT IN AN ORGANIZED HEALTH CARE EDUCATION/TRAINING PROGRAM

## 2023-01-31 PROCEDURE — 81002 URINALYSIS NONAUTO W/O SCOPE: CPT | Performed by: STUDENT IN AN ORGANIZED HEALTH CARE EDUCATION/TRAINING PROGRAM

## 2023-01-31 PROCEDURE — 4000F TOBACCO USE TXMNT COUNSELING: CPT | Performed by: STUDENT IN AN ORGANIZED HEALTH CARE EDUCATION/TRAINING PROGRAM

## 2023-01-31 PROCEDURE — 99204 OFFICE O/P NEW MOD 45 MIN: CPT | Performed by: STUDENT IN AN ORGANIZED HEALTH CARE EDUCATION/TRAINING PROGRAM

## 2023-01-31 RX ORDER — FAMOTIDINE 20 MG/1
20 TABLET, FILM COATED ORAL 2 TIMES DAILY
Qty: 60 TABLET | Refills: 3 | Status: SHIPPED | OUTPATIENT
Start: 2023-01-31

## 2023-01-31 SDOH — ECONOMIC STABILITY: FOOD INSECURITY: WITHIN THE PAST 12 MONTHS, THE FOOD YOU BOUGHT JUST DIDN'T LAST AND YOU DIDN'T HAVE MONEY TO GET MORE.: NEVER TRUE

## 2023-01-31 SDOH — ECONOMIC STABILITY: FOOD INSECURITY: WITHIN THE PAST 12 MONTHS, YOU WORRIED THAT YOUR FOOD WOULD RUN OUT BEFORE YOU GOT MONEY TO BUY MORE.: NEVER TRUE

## 2023-01-31 ASSESSMENT — PATIENT HEALTH QUESTIONNAIRE - PHQ9
SUM OF ALL RESPONSES TO PHQ QUESTIONS 1-9: 0
SUM OF ALL RESPONSES TO PHQ QUESTIONS 1-9: 0
5. POOR APPETITE OR OVEREATING: 0
SUM OF ALL RESPONSES TO PHQ QUESTIONS 1-9: 0
9. THOUGHTS THAT YOU WOULD BE BETTER OFF DEAD, OR OF HURTING YOURSELF: 0
3. TROUBLE FALLING OR STAYING ASLEEP: 0
4. FEELING TIRED OR HAVING LITTLE ENERGY: 0
1. LITTLE INTEREST OR PLEASURE IN DOING THINGS: 0
7. TROUBLE CONCENTRATING ON THINGS, SUCH AS READING THE NEWSPAPER OR WATCHING TELEVISION: 0
SUM OF ALL RESPONSES TO PHQ9 QUESTIONS 1 & 2: 0
2. FEELING DOWN, DEPRESSED OR HOPELESS: 0
10. IF YOU CHECKED OFF ANY PROBLEMS, HOW DIFFICULT HAVE THESE PROBLEMS MADE IT FOR YOU TO DO YOUR WORK, TAKE CARE OF THINGS AT HOME, OR GET ALONG WITH OTHER PEOPLE: 0
6. FEELING BAD ABOUT YOURSELF - OR THAT YOU ARE A FAILURE OR HAVE LET YOURSELF OR YOUR FAMILY DOWN: 0
SUM OF ALL RESPONSES TO PHQ QUESTIONS 1-9: 0
8. MOVING OR SPEAKING SO SLOWLY THAT OTHER PEOPLE COULD HAVE NOTICED. OR THE OPPOSITE, BEING SO FIGETY OR RESTLESS THAT YOU HAVE BEEN MOVING AROUND A LOT MORE THAN USUAL: 0

## 2023-01-31 ASSESSMENT — ENCOUNTER SYMPTOMS
DIARRHEA: 1
BLOOD IN STOOL: 1

## 2023-01-31 ASSESSMENT — SOCIAL DETERMINANTS OF HEALTH (SDOH): HOW HARD IS IT FOR YOU TO PAY FOR THE VERY BASICS LIKE FOOD, HOUSING, MEDICAL CARE, AND HEATING?: NOT HARD AT ALL

## 2023-01-31 NOTE — PROGRESS NOTES
[facility]  Establish care visit   2023    Marcos Ashton (:  2000) is a 25 y.o. male, here to establish care. Chief Complaint   Patient presents with    Establish Care     Pt is here to establish care, severe cramps, urine has been foaming         ASSESSMENT/ PLAN  1. Encounter to establish care  Patient will follow-up for annual physical.  History of lead exposure. We will recheck lead level. 2. Dysuria  Family history of Crohn's. Suspect symptoms are GI related. Urine dip noncontributory. Trace proteinuria. Will check GC chlamydia. 3. Hematuria, unspecified type  No blood on urine dip today. We will check GC chlamydia. Follow-up 1 month for repeat urine dip to ensure no hematuria or proteinuria. 4. History of lead exposure  Labs  - C.trachomatis N.gonorrhoeae DNA, Urine; Future  - Lead, Blood; Future  - CBC with Auto Differential; Future  - Comprehensive Metabolic Panel; Future    5. Family history of Crohn's disease  Referral to GI  - DON Saldivar MD, Gastroenterology (ERCP & EUS), [place]    6. Blood in stool  Referral to GI. Patient informed to go to the ER for shortness of breath, fatigue, presyncopal feeling, or filling the bowl with blood. - CBC with Auto Differential; Future  - DON Saldivar MD, Gastroenterology (ERCP & EUS), [place]    7. Poor weight gain in adult  Referral to GI.  - DON Saldivar MD, Gastroenterology (ERCP & EUS), [place]     Return in about 1 month (around 2023) for physical.    HPI  Has had foamy urine, cramps, dark and smelly urine, dysuria, and itching. Occasional chills. Had some nausea and vomiting a week ago. Has diarrhea about 3 times per week. No fevers. Last new partner 8 months ago. Was checked for STI two months ago. Reports mother has Crohn's disease. He was almost hospitalized for poor weight gain. He has not seen GI yet.   He has had a few months of bloody diarrhea. Also endorses a couple episodes of melena. Patient is sexually active with 1 male partner. ROS  Review of Systems   Constitutional:  Positive for chills. Negative for fever. Gastrointestinal:  Positive for blood in stool and diarrhea. HISTORIES  No current outpatient medications on file prior to visit. No current facility-administered medications on file prior to visit. Allergies   Allergen Reactions    Mucinex D [Pseudoephedrine-Guaifenesin Er] Hives    Amoxicillin Hives, Nausea And Vomiting and Swelling    Penicillins Hives and Nausea And Vomiting    Robitussin Severe Cgh-Sr Thrt [Acetaminophen-Dm] Hives and Nausea And Vomiting       Past Medical History:   Diagnosis Date    ADHD     Childhood asthma        There is no problem list on file for this patient. No past surgical history on file. Social History     Socioeconomic History    Marital status: Single     Spouse name: Not on file    Number of children: Not on file    Years of education: Not on file    Highest education level: Not on file   Occupational History    Not on file   Tobacco Use    Smoking status: Former     Packs/day: 1.00     Types: Cigarettes, E-Cigarettes    Smokeless tobacco: Never   Vaping Use    Vaping Use: Every day    Substances: Nicotine, THC (sometimes)    Devices: Disposable   Substance and Sexual Activity    Alcohol use:  Yes     Alcohol/week: 1.0 standard drink     Types: 1 Cans of beer per week    Drug use: Yes     Types: Marijuana Garon Ovalle)    Sexual activity: Not on file   Other Topics Concern    Not on file   Social History Narrative    Not on file     Social Determinants of Health     Financial Resource Strain: Low Risk     Difficulty of Paying Living Expenses: Not hard at all   Food Insecurity: No Food Insecurity    Worried About Running Out of Food in the Last Year: Never true    Ran Out of Food in the Last Year: Never true   Transportation Needs: Not on file   Physical Activity: Not on file   Stress: Not on file   Social Connections: Not on file   Intimate Partner Violence: Not on file   Housing Stability: Not on file        Family History   Problem Relation Age of Onset    Cirrhosis Mother     Diabetes type 2  Mother     Crohn's Disease Mother     Cirrhosis Maternal Grandfather     Diabetes type 2  Maternal Grandfather     Lung Cancer Maternal Uncle        PE  Vitals:    01/31/23 0853   BP: 112/82   Site: Left Upper Arm   Position: Sitting   Pulse: (!) 116   Resp: 16   SpO2: 97%   Weight: 139 lb 3.2 oz (63.1 kg)   Height: 6' 2.7\" (1.897 m)     Estimated body mass index is 17.54 kg/m² as calculated from the following:    Height as of this encounter: 6' 2.7\" (1.897 m). Weight as of this encounter: 139 lb 3.2 oz (63.1 kg). Physical Exam  Constitutional:       Appearance: Normal appearance. He is normal weight. HENT:      Head: Normocephalic and atraumatic. Mouth/Throat:      Mouth: Mucous membranes are moist.      Pharynx: Oropharynx is clear. Eyes:      Extraocular Movements: Extraocular movements intact. Conjunctiva/sclera: Conjunctivae normal.      Pupils: Pupils are equal, round, and reactive to light. Cardiovascular:      Rate and Rhythm: Normal rate and regular rhythm. Heart sounds: Normal heart sounds. No murmur heard. No friction rub. No gallop. Pulmonary:      Effort: Pulmonary effort is normal.      Breath sounds: Normal breath sounds. Abdominal:      General: Abdomen is flat. There is no distension. Palpations: Abdomen is soft. Tenderness: There is abdominal tenderness (Generalized abdominal tenderness worst in left hemisphere. ). There is no guarding or rebound. Musculoskeletal:      Cervical back: Normal range of motion. Neurological:      General: No focal deficit present. Mental Status: He is alert.    Psychiatric:         Mood and Affect: Mood normal.         Behavior: Behavior normal.       Immunization History   Administered Date(s) Administered    COVID-19, PFIZER PURPLE top, DILUTE for use, (age 15 y+), 30mcg/0.3mL 10/17/2021       Health Maintenance   Topic Date Due    Hepatitis A vaccine (2 of 2 - 2-dose series) 10/11/2018    Hepatitis C screen  Never done    COVID-19 Vaccine (2 - Pfizer series) 11/07/2021    Flu vaccine (1) 08/01/2022    Depression Screen  01/31/2024    DTaP/Tdap/Td vaccine (7 - Td or Tdap) 09/08/2026    Hib vaccine  Completed    HPV vaccine  Completed    Varicella vaccine  Completed    HIV screen  Completed    Meningococcal (ACWY) vaccine  Aged Out    Pneumococcal 0-64 years Vaccine  Aged Out       PSH, PMH, SH and FH reviewed and noted. Recent and past labs, tests and consults also reviewed. Recent or new meds also reviewed. Felicity Villalobos, DO    This dictation was generated by voice recognition computer software. Although all attempts are made to edit the dictation for accuracy, there may be errors in the transcription that are not intended.

## 2023-01-31 NOTE — LETTER
[address]  [address] [place] [address]  Phone: [phone]  Fax: [phone]    Cabrera Hudson DO        January 31, 2023     Patient: Juliana Torrez   YOB: 2000   Date of Visit: 1/31/2023       To Whom it May Concern:    Juliana Torrez was seen in my clinic on 1/31/2023..    If you have any questions or concerns, please don't hesitate to call.    Sincerely,         Cabrera Hudson DO

## 2023-02-02 LAB
C. TRACHOMATIS DNA ,URINE: NEGATIVE
N. GONORRHOEAE DNA, URINE: NEGATIVE
REASON FOR REJECTION: NORMAL
REJECTED TEST: NORMAL

## 2023-02-07 LAB — LEAD LEVEL BLOOD: <2 UG/DL

## 2023-02-09 ENCOUNTER — TELEPHONE (OUTPATIENT)
Dept: FAMILY MEDICINE CLINIC | Age: 23
End: 2023-02-09

## 2023-02-09 NOTE — TELEPHONE ENCOUNTER
----- Message from [address] sent at 2/9/2023  2:55 PM EST -----  Subject: Referral Request    Reason for referral request? Kate Paul would like an order for a CT scan. This was recommend by [facility]. 2/5/2023. For any questions,   please call. Thank you   Provider patient wants to be referred to(if known):     Provider Phone Number(if known):     Additional Information for Provider?   ---------------------------------------------------------------------------  --------------  [id number]    [phone]; OK to leave message on voicemail  ---------------------------------------------------------------------------  --------------

## 2023-02-09 NOTE — TELEPHONE ENCOUNTER
L/M to get more information, what is the CT scan for?  Was it discussed with  in office, if not pt needs appt

## 2023-02-09 NOTE — TELEPHONE ENCOUNTER
Patient states that he was at Urgent Care and they gave him an order for a CT Scan for abd pain and it , he is asking for a new order. Per  patient needs to be evaluated in the office, l/m for patient to return call and schedule an appt.

## 2023-02-10 ENCOUNTER — HOSPITAL ENCOUNTER (OUTPATIENT)
Dept: CT IMAGING | Age: 23
Discharge: HOME | End: 2023-02-10
Payer: MEDICAID

## 2023-02-10 ENCOUNTER — OFFICE VISIT (OUTPATIENT)
Dept: FAMILY MEDICINE CLINIC | Age: 23
End: 2023-02-10

## 2023-02-10 VITALS
DIASTOLIC BLOOD PRESSURE: 72 MMHG | SYSTOLIC BLOOD PRESSURE: 114 MMHG | HEART RATE: 80 BPM | WEIGHT: 139 LBS | OXYGEN SATURATION: 99 % | HEIGHT: 75 IN | BODY MASS INDEX: 17.28 KG/M2

## 2023-02-10 DIAGNOSIS — R11.2 NAUSEA AND VOMITING, UNSPECIFIED VOMITING TYPE: ICD-10-CM

## 2023-02-10 DIAGNOSIS — Z79.1 NSAID LONG-TERM USE: ICD-10-CM

## 2023-02-10 DIAGNOSIS — R10.30 LOWER ABDOMINAL PAIN: ICD-10-CM

## 2023-02-10 DIAGNOSIS — R10.30 LOWER ABDOMINAL PAIN: Primary | ICD-10-CM

## 2023-02-10 DIAGNOSIS — R04.2 COUGHING UP BLOOD: ICD-10-CM

## 2023-02-10 LAB
A/G RATIO: 1.8 (ref 1.1–2.2)
ALBUMIN SERPL-MCNC: 4.9 G/DL (ref 3.4–5)
ALP BLD-CCNC: 96 U/L (ref 40–129)
ALT SERPL-CCNC: 20 U/L (ref 10–40)
ANION GAP SERPL CALCULATED.3IONS-SCNC: 16 MMOL/L (ref 3–16)
AST SERPL-CCNC: 17 U/L (ref 15–37)
BASOPHILS ABSOLUTE: 0 K/UL (ref 0–0.2)
BASOPHILS RELATIVE PERCENT: 0.3 %
BILIRUB SERPL-MCNC: 0.3 MG/DL (ref 0–1)
BUN BLDV-MCNC: 10 MG/DL (ref 7–20)
CALCIUM SERPL-MCNC: 10.2 MG/DL (ref 8.3–10.6)
CHLORIDE BLD-SCNC: 99 MMOL/L (ref 99–110)
CO2: 27 MMOL/L (ref 21–32)
CREAT SERPL-MCNC: 0.8 MG/DL (ref 0.9–1.3)
EOSINOPHILS ABSOLUTE: 0 K/UL (ref 0–0.6)
EOSINOPHILS RELATIVE PERCENT: 0.4 %
GFR SERPL CREATININE-BSD FRML MDRD: >60 ML/MIN/{1.73_M2}
GLUCOSE BLD-MCNC: 87 MG/DL (ref 70–99)
HCT VFR BLD CALC: 45 % (ref 40.5–52.5)
HEMOGLOBIN: 15.5 G/DL (ref 13.5–17.5)
LIPASE: 28 U/L (ref 13–60)
LYMPHOCYTES ABSOLUTE: 4.5 K/UL (ref 1–5.1)
LYMPHOCYTES RELATIVE PERCENT: 55.6 %
MCH RBC QN AUTO: 30.7 PG (ref 26–34)
MCHC RBC AUTO-ENTMCNC: 34.4 G/DL (ref 31–36)
MCV RBC AUTO: 89.2 FL (ref 80–100)
MONOCYTES ABSOLUTE: 0.5 K/UL (ref 0–1.3)
MONOCYTES RELATIVE PERCENT: 6.1 %
NEUTROPHILS ABSOLUTE: 3.1 K/UL (ref 1.7–7.7)
NEUTROPHILS RELATIVE PERCENT: 37.6 %
PDW BLD-RTO: 12.9 % (ref 12.4–15.4)
PLATELET # BLD: 221 K/UL (ref 135–450)
PMV BLD AUTO: 7.9 FL (ref 5–10.5)
POTASSIUM SERPL-SCNC: 4.4 MMOL/L (ref 3.5–5.1)
PROCALCITONIN: 0.03 NG/ML (ref 0–0.15)
RBC # BLD: 5.05 M/UL (ref 4.2–5.9)
SODIUM BLD-SCNC: 142 MMOL/L (ref 136–145)
TOTAL PROTEIN: 7.6 G/DL (ref 6.4–8.2)
WBC # BLD: 8.1 K/UL (ref 4–11)

## 2023-02-10 PROCEDURE — 6360000004 HC RX CONTRAST MEDICATION: Performed by: STUDENT IN AN ORGANIZED HEALTH CARE EDUCATION/TRAINING PROGRAM

## 2023-02-10 PROCEDURE — 74176 CT ABD & PELVIS W/O CONTRAST: CPT

## 2023-02-10 RX ORDER — PANTOPRAZOLE SODIUM 40 MG/1
40 TABLET, DELAYED RELEASE ORAL
Qty: 60 TABLET | Refills: 0 | Status: SHIPPED | OUTPATIENT
Start: 2023-02-10

## 2023-02-10 RX ORDER — METHYLPREDNISOLONE 4 MG/1
TABLET ORAL
COMMUNITY
Start: 2023-02-05

## 2023-02-10 RX ORDER — DICYCLOMINE HCL 20 MG
20 TABLET ORAL 4 TIMES DAILY PRN
COMMUNITY
Start: 2023-02-05 | End: 2023-03-07

## 2023-02-10 RX ORDER — SUCRALFATE ORAL 1 G/10ML
1 SUSPENSION ORAL 4 TIMES DAILY
Qty: 1200 ML | Refills: 3 | Status: SHIPPED | OUTPATIENT
Start: 2023-02-10

## 2023-02-10 RX ORDER — POLYETHYLENE GLYCOL 1450
1 POWDER (GRAM) MISCELLANEOUS 2 TIMES DAILY
Qty: 500 G | Refills: 0 | Status: SHIPPED | OUTPATIENT
Start: 2023-02-10

## 2023-02-10 RX ADMIN — DIATRIZOATE MEGLUMINE AND DIATRIZOATE SODIUM 12 ML: 660; 100 LIQUID ORAL; RECTAL at 09:35

## 2023-02-10 SDOH — ECONOMIC STABILITY: HOUSING INSECURITY
IN THE LAST 12 MONTHS, WAS THERE A TIME WHEN YOU DID NOT HAVE A STEADY PLACE TO SLEEP OR SLEPT IN A SHELTER (INCLUDING NOW)?: NO

## 2023-02-10 SDOH — ECONOMIC STABILITY: FOOD INSECURITY: WITHIN THE PAST 12 MONTHS, YOU WORRIED THAT YOUR FOOD WOULD RUN OUT BEFORE YOU GOT MONEY TO BUY MORE.: NEVER TRUE

## 2023-02-10 SDOH — ECONOMIC STABILITY: FOOD INSECURITY: WITHIN THE PAST 12 MONTHS, THE FOOD YOU BOUGHT JUST DIDN'T LAST AND YOU DIDN'T HAVE MONEY TO GET MORE.: NEVER TRUE

## 2023-02-10 SDOH — ECONOMIC STABILITY: INCOME INSECURITY: HOW HARD IS IT FOR YOU TO PAY FOR THE VERY BASICS LIKE FOOD, HOUSING, MEDICAL CARE, AND HEATING?: NOT HARD AT ALL

## 2023-02-10 ASSESSMENT — ENCOUNTER SYMPTOMS
RHINORRHEA: 1
BLOOD IN STOOL: 0
COUGH: 1
VOMITING: 1
CONSTIPATION: 1
NAUSEA: 1
DIARRHEA: 1
ABDOMINAL PAIN: 1

## 2023-02-10 NOTE — PROGRESS NOTES
[facility]  2/10/2023    Sarah Ponce (:  2000) is a 26 y.o. male, here for evaluation of the following medical concerns:    Chief Complaint   Patient presents with    Abdominal Pain     Abdominal pain x 3 months, no blood in the stool, went to urgent care and stat ct was ordered however he did not get completed in time and the order         ASSESSMENT/ PLAN  1. Lower abdominal pain  Suspect possible constipation due to [facility] stool chart 1. Urine work-up was negative at last visit. Does have failure to thrive and more serious causes should be ruled out with stat CT. Has some mild voluntary guarding in the lower abdominal region. If CT is negative patient will start MiraLAX 1 cap p.o. twice daily. Follow-up 1 to 2 weeks. ER for severe pain. - pantoprazole (PROTONIX) 40 MG tablet; Take 1 tablet by mouth 2 times daily (before meals)  Dispense: 60 tablet; Refill: 0  - sucralfate (CARAFATE) 1 GM/10ML suspension; Take 10 mLs by mouth 4 times daily  Dispense: 1200 mL; Refill: 3  - CT ABDOMEN PELVIS WO CONTRAST Additional Contrast? Radiologist Recommendation; Future  - AFL - Danielle Valenzuela MD, Gastroenterology (ERCP & EUS), [facility]  - [facility] with Auto Differential; Future  - Comprehensive Metabolic Panel; Future  - Procalcitonin; Future  - Lipase; Future    2. NSAID long-term use  Stop NSAIDs  - pantoprazole (PROTONIX) 40 MG tablet; Take 1 tablet by mouth 2 times daily (before meals)  Dispense: 60 tablet; Refill: 0  - sucralfate (CARAFATE) 1 GM/10ML suspension; Take 10 mLs by mouth 4 times daily  Dispense: 1200 mL; Refill: 3  - CT ABDOMEN PELVIS WO CONTRAST Additional Contrast? Radiologist Recommendation; Future  - AFL - Danielle Valenzuela MD, Gastroenterology (ERCP & EUS), [facility]  - CBC with Auto Differential; Future  - Comprehensive Metabolic Panel; Future  - Procalcitonin; Future  - Lipase; Future    3.  Nausea and vomiting, unspecified vomiting type  Protonix, Carafate, MiraLAX, GI referral.  - pantoprazole (PROTONIX) 40 MG tablet; Take 1 tablet by mouth 2 times daily (before meals)  Dispense: 60 tablet; Refill: 0  - sucralfate (CARAFATE) 1 GM/10ML suspension; Take 10 mLs by mouth 4 times daily  Dispense: 1200 mL; Refill: 3  - DON - Anabel Evans MD, Gastroenterology (ERCP & EUS), [facility]  - CBC with Auto Differential; Future  - Comprehensive Metabolic Panel; Future  - Procalcitonin; Future  - Lipase; Future    4. Coughing up blood  Suspect esophagitis as blood streaks in toilet water, heartburn, daily NSAID use, nausea and vomiting twice weekly, small amount, never been homeless or incarcerated, no weight loss, no sick contacts. -GI referral  - CBC, CMP, lipase, Pro-Otoniel.  - Protonix twice daily, Carafate  - Stop NSAIDs, can use Tylenol as needed for pain. Patient tolerates Tylenol he had a reaction to Tylenol DM.  - pantoprazole (PROTONIX) 40 MG tablet; Take 1 tablet by mouth 2 times daily (before meals)  Dispense: 60 tablet; Refill: 0  - sucralfate (CARAFATE) 1 GM/10ML suspension; Take 10 mLs by mouth 4 times daily  Dispense: 1200 mL; Refill: 3  - DON - Anabel Evans MD, Gastroenterology (ERCP & EUS), [facility]  - CBC with Auto Differential; Future  - Comprehensive Metabolic Panel; Future  - Procalcitonin; Future  - Lipase; Future     Return in about 1 week (around 2023), or 1-2 weeks as needed for abdominal pain. HPI  3 months of lower abdominal pain off and on. When present it is sharp. Pain is consistent in the same spot. Taking ibuprofen about every 6 hours due to the pain. Cough productive of blood one time a few days ago. Went to ER at [place] [address] and workup was negative. Went to urgent care for abdominal pain. They ordered a STAT CT but the order was only good for 24 hours and , patient wasn't able to go that day. Pain is currently 5/10.       ROS  Review of Systems   Constitutional:  Negative for chills, fever and unexpected weight change. Night sweats   HENT:  Positive for rhinorrhea. Respiratory:  Positive for cough. Gastrointestinal:  Positive for abdominal pain, constipation, diarrhea, nausea and vomiting. Negative for blood in stool. HISTORIES  Current Outpatient Medications on File Prior to Visit   Medication Sig Dispense Refill    dicyclomine (BENTYL) 20 MG tablet Take 20 mg by mouth 4 times daily as needed      methylPREDNISolone (MEDROL DOSEPACK) 4 MG tablet TAKE 1 ROW OF TABLETS BY MOUTH EACH DAY AS INSTRUCTED ON THE PACKAGE       No current facility-administered medications on file prior to visit. Past Medical History:   Diagnosis Date    ADHD     Childhood asthma      There is no problem list on file for this patient. PE  Vitals:    02/10/23 0731   BP: 114/72   Site: Left Upper Arm   Position: Sitting   Pulse: 80   SpO2: 99%   Weight: 139 lb (63 kg)   Height: 6' 2.7\" (1.897 m)     Estimated body mass index is 17.51 kg/m² as calculated from the following:    Height as of this encounter: 6' 2.7\" (1.897 m). Weight as of this encounter: 139 lb (63 kg). Physical Exam  Constitutional:       Appearance: Normal appearance. He is normal weight. HENT:      Head: Normocephalic and atraumatic. Mouth/Throat:      Mouth: Mucous membranes are moist.      Pharynx: Oropharynx is clear. Eyes:      Extraocular Movements: Extraocular movements intact. Conjunctiva/sclera: Conjunctivae normal.      Pupils: Pupils are equal, round, and reactive to light. Cardiovascular:      Rate and Rhythm: Normal rate and regular rhythm. Heart sounds: Normal heart sounds. No murmur heard. No friction rub. No gallop. Pulmonary:      Effort: Pulmonary effort is normal.      Breath sounds: Normal breath sounds. Abdominal:      General: Abdomen is flat. There is no distension. Palpations: Abdomen is soft. Tenderness: There is no abdominal tenderness.  There is no guarding or rebound. Comments: Mild lower abdominal voluntary guarding. No rebound. Musculoskeletal:      Cervical back: Normal range of motion. Neurological:      General: No focal deficit present. Mental Status: He is alert. Psychiatric:         Mood and Affect: Mood normal.         Behavior: Behavior normal.       Dillan Land DO    This dictation was generated by voice recognition computer software. Although all attempts are made to edit the dictation for accuracy, there may be errors in the transcription that are not intended.

## 2023-02-28 ENCOUNTER — OFFICE VISIT (OUTPATIENT)
Dept: FAMILY MEDICINE CLINIC | Age: 23
End: 2023-02-28
Payer: MEDICAID

## 2023-02-28 VITALS
HEART RATE: 84 BPM | OXYGEN SATURATION: 98 % | DIASTOLIC BLOOD PRESSURE: 80 MMHG | WEIGHT: 135.6 LBS | HEIGHT: 75 IN | BODY MASS INDEX: 16.86 KG/M2 | SYSTOLIC BLOOD PRESSURE: 116 MMHG

## 2023-02-28 DIAGNOSIS — F31.4 BIPOLAR DISORDER, CURRENT EPISODE DEPRESSED, SEVERE, WITHOUT PSYCHOTIC FEATURES (HCC): ICD-10-CM

## 2023-02-28 DIAGNOSIS — Z00.00 PHYSICAL EXAM: Primary | ICD-10-CM

## 2023-02-28 DIAGNOSIS — F32.A DEPRESSION, UNSPECIFIED DEPRESSION TYPE: ICD-10-CM

## 2023-02-28 PROCEDURE — 90471 IMMUNIZATION ADMIN: CPT | Performed by: STUDENT IN AN ORGANIZED HEALTH CARE EDUCATION/TRAINING PROGRAM

## 2023-02-28 PROCEDURE — 99395 PREV VISIT EST AGE 18-39: CPT | Performed by: STUDENT IN AN ORGANIZED HEALTH CARE EDUCATION/TRAINING PROGRAM

## 2023-02-28 PROCEDURE — 90632 HEPA VACCINE ADULT IM: CPT | Performed by: STUDENT IN AN ORGANIZED HEALTH CARE EDUCATION/TRAINING PROGRAM

## 2023-02-28 RX ORDER — ARIPIPRAZOLE 5 MG/1
5 TABLET ORAL DAILY
Qty: 30 TABLET | Refills: 0 | Status: SHIPPED | OUTPATIENT
Start: 2023-02-28

## 2023-02-28 ASSESSMENT — ENCOUNTER SYMPTOMS
RHINORRHEA: 1
COUGH: 0
SHORTNESS OF BREATH: 0

## 2023-02-28 ASSESSMENT — PATIENT HEALTH QUESTIONNAIRE - PHQ9
SUM OF ALL RESPONSES TO PHQ QUESTIONS 1-9: 19
2. FEELING DOWN, DEPRESSED OR HOPELESS: 2
10. IF YOU CHECKED OFF ANY PROBLEMS, HOW DIFFICULT HAVE THESE PROBLEMS MADE IT FOR YOU TO DO YOUR WORK, TAKE CARE OF THINGS AT HOME, OR GET ALONG WITH OTHER PEOPLE: 3
SUM OF ALL RESPONSES TO PHQ QUESTIONS 1-9: 21
6. FEELING BAD ABOUT YOURSELF - OR THAT YOU ARE A FAILURE OR HAVE LET YOURSELF OR YOUR FAMILY DOWN: 3
SUM OF ALL RESPONSES TO PHQ QUESTIONS 1-9: 21
1. LITTLE INTEREST OR PLEASURE IN DOING THINGS: 3
SUM OF ALL RESPONSES TO PHQ QUESTIONS 1-9: 21
4. FEELING TIRED OR HAVING LITTLE ENERGY: 3
3. TROUBLE FALLING OR STAYING ASLEEP: 3
SUM OF ALL RESPONSES TO PHQ9 QUESTIONS 1 & 2: 5
9. THOUGHTS THAT YOU WOULD BE BETTER OFF DEAD, OR OF HURTING YOURSELF: 2
5. POOR APPETITE OR OVEREATING: 3
8. MOVING OR SPEAKING SO SLOWLY THAT OTHER PEOPLE COULD HAVE NOTICED. OR THE OPPOSITE, BEING SO FIGETY OR RESTLESS THAT YOU HAVE BEEN MOVING AROUND A LOT MORE THAN USUAL: 2
7. TROUBLE CONCENTRATING ON THINGS, SUCH AS READING THE NEWSPAPER OR WATCHING TELEVISION: 0

## 2023-02-28 NOTE — PROGRESS NOTES
2023    Nabeel Miller (:  2000) is a 26 y.o. male, here for a preventive medicine evaluation. Works at [employer] and walks quite a bit for work. Not doing any formal exercise. Needs to work on increasing fruit and vegetable intake daily, gets about 1 serving each per day. Not taking multivitamin daily. Needs to establish with dentistry. Not good about brushing and flossing daily. Patient has had feelings of feeling better off dead and some thoughts of hurting himself but denies any active plan to hurt himself or anyone else. There is no problem list on file for this patient. Review of Systems   Constitutional:  Negative for chills and fever. HENT:  Positive for rhinorrhea and sneezing. Respiratory:  Negative for cough and shortness of breath. Prior to Visit Medications    Medication Sig Taking? Authorizing Provider   ARIPiprazole (ABILIFY) 5 MG tablet Take 1 tablet by mouth daily Yes Dillan Catching, DO   pantoprazole (PROTONIX) 40 MG tablet Take 1 tablet by mouth 2 times daily (before meals) Yes Dillan Catching, DO   sucralfate (CARAFATE) 1 GM/10ML suspension Take 10 mLs by mouth 4 times daily Yes Dillan Catching, DO        Allergies   Allergen Reactions    Mucinex D [Pseudoephedrine-Guaifenesin Er] Hives    Amoxicillin Hives, Nausea And Vomiting and Swelling    Penicillins Hives and Nausea And Vomiting    Robitussin Severe Cgh-Sr Thrt [Acetaminophen-Dm] Hives and Nausea And Vomiting       Past Medical History:   Diagnosis Date    ADHD     Childhood asthma        No past surgical history on file.     Social History     Socioeconomic History    Marital status: Single     Spouse name: Not on file    Number of children: Not on file    Years of education: Not on file    Highest education level: Not on file   Occupational History    Not on file   Tobacco Use    Smoking status: Former     Packs/day: 1.00     Types: Cigarettes, E-Cigarettes    Smokeless tobacco: Never   Vaping Use    Vaping Use: Every day    Substances: Nicotine, THC (sometimes)    Devices: Disposable   Substance and Sexual Activity    Alcohol use: Yes     Alcohol/week: 1.0 standard drink     Types: 1 Cans of beer per week    Drug use: Yes     Types: Marijuana Nghia Ion)    Sexual activity: Not on file   Other Topics Concern    Not on file   Social History Narrative    Not on file     Social Determinants of Health     Financial Resource Strain: Low Risk     Difficulty of Paying Living Expenses: Not hard at all   Food Insecurity: No Food Insecurity    Worried About [facility] in the Last Year: Never true    [address] in the Last Year: Never true   Transportation Needs: Unknown    Lack of Transportation (Medical): Not on file    Lack of Transportation (Non-Medical): No   Physical Activity: Not on file   Stress: Not on file   Social Connections: Not on file   Intimate Partner Violence: Not on file   Housing Stability: Unknown    Unable to Pay for Housing in the Last Year: Not on file    Number of Places Lived in the Last Year: Not on file    Unstable Housing in the Last Year: No        Family History   Problem Relation Age of Onset    Cirrhosis Mother     Diabetes type 2  Mother     Crohn's Disease Mother     Depression Mother     Cirrhosis Maternal Grandfather     Diabetes type 2  Maternal Grandfather     Lung Cancer Maternal Uncle        ADVANCE DIRECTIVE: N, <no information>    Vitals:    02/28/23 1019   BP: 116/80   Site: Left Upper Arm   Position: Sitting   Pulse: 84   SpO2: 98%   Weight: 135 lb 9.6 oz (61.5 kg)   Height: 6' 2.7\" (1.897 m)     Estimated body mass index is 17.09 kg/m² as calculated from the following:    Height as of this encounter: 6' 2.7\" (1.897 m). Weight as of this encounter: 135 lb 9.6 oz (61.5 kg). Physical Exam  Constitutional:       Appearance: Normal appearance. He is normal weight. HENT:      Head: Normocephalic and atraumatic.       Mouth/Throat:      Mouth: Mucous membranes are moist.      Pharynx: Oropharynx is clear. Eyes:      Extraocular Movements: Extraocular movements intact. Conjunctiva/sclera: Conjunctivae normal.      Pupils: Pupils are equal, round, and reactive to light. Cardiovascular:      Rate and Rhythm: Normal rate and regular rhythm. Heart sounds: Normal heart sounds. No murmur heard. No friction rub. No gallop. Pulmonary:      Effort: Pulmonary effort is normal.      Breath sounds: Normal breath sounds. Abdominal:      General: Abdomen is flat. There is no distension. Palpations: Abdomen is soft. Tenderness: There is no abdominal tenderness. There is no guarding or rebound. Musculoskeletal:      Cervical back: Normal range of motion. Neurological:      General: No focal deficit present. Mental Status: He is alert. Psychiatric:         Mood and Affect: Mood normal.         Behavior: Behavior normal.       No flowsheet data found. Lab Results   Component Value Date/Time    GLUCOSE 87 02/10/2023 07:56 AM       The ASCVD Risk score (Hossein LOPEZ, et al., 2019) failed to calculate for the following reasons:     The 2019 ASCVD risk score is only valid for ages 36 to 78    Immunization History   Administered Date(s) Administered    COVID-19, PFIZER PURPLE top, DILUTE for use, (age 15 y+), 30mcg/0.3mL 10/17/2021    Hepatitis A Adult (Havrix, [place]) 02/28/2023       Health Maintenance   Topic Date Due    Hepatitis C screen  Never done    COVID-19 Vaccine (2 - Pfizer series) 11/07/2021    Flu vaccine (1) 08/01/2022    Depression Monitoring  02/28/2024    DTaP/Tdap/Td vaccine (7 - Td or Tdap) 09/08/2026    Hepatitis A vaccine  Completed    Hib vaccine  Completed    HPV vaccine  Completed    Varicella vaccine  Completed    HIV screen  Completed    Meningococcal (ACWY) vaccine  Aged Out    Pneumococcal 0-64 years Vaccine  Aged Out       Assessment & Plan   Physical exam  Depression, unspecified depression type  Bipolar disorder, current episode depressed, severe, without psychotic features ([facility] [address]  No follow-ups on file. -Encouraged exercise and increase fruit and vegetable intake  - Encouraged to schedule with dentistry and optometry  - GeneSight testing performed today, will start Abilify due to bipolar symptoms. Patient was given referral to psychology for further counseling. Follow-up in 4 weeks. He will go to the ER at [facility] for development of plan.   Currently denies active plan    --Lakia Nichole, DO

## 2023-03-02 ENCOUNTER — OFFICE VISIT (OUTPATIENT)
Dept: PSYCHOLOGY | Age: 23
End: 2023-03-02

## 2023-03-02 DIAGNOSIS — F41.8 DEPRESSION WITH ANXIETY: Primary | ICD-10-CM

## 2023-03-02 ASSESSMENT — ANXIETY QUESTIONNAIRES
3. WORRYING TOO MUCH ABOUT DIFFERENT THINGS: 3
7. FEELING AFRAID AS IF SOMETHING AWFUL MIGHT HAPPEN: 3
GAD7 TOTAL SCORE: 21
4. TROUBLE RELAXING: 3
1. FEELING NERVOUS, ANXIOUS, OR ON EDGE: 3
2. NOT BEING ABLE TO STOP OR CONTROL WORRYING: 3
6. BECOMING EASILY ANNOYED OR IRRITABLE: 3
5. BEING SO RESTLESS THAT IT IS HARD TO SIT STILL: 3

## 2023-03-02 ASSESSMENT — PATIENT HEALTH QUESTIONNAIRE - PHQ9
4. FEELING TIRED OR HAVING LITTLE ENERGY: 2
3. TROUBLE FALLING OR STAYING ASLEEP: 3
5. POOR APPETITE OR OVEREATING: 3
7. TROUBLE CONCENTRATING ON THINGS, SUCH AS READING THE NEWSPAPER OR WATCHING TELEVISION: 0
SUM OF ALL RESPONSES TO PHQ QUESTIONS 1-9: 21
SUM OF ALL RESPONSES TO PHQ QUESTIONS 1-9: 21
8. MOVING OR SPEAKING SO SLOWLY THAT OTHER PEOPLE COULD HAVE NOTICED. OR THE OPPOSITE, BEING SO FIGETY OR RESTLESS THAT YOU HAVE BEEN MOVING AROUND A LOT MORE THAN USUAL: 3
SUM OF ALL RESPONSES TO PHQ9 QUESTIONS 1 & 2: 6
SUM OF ALL RESPONSES TO PHQ QUESTIONS 1-9: 19
9. THOUGHTS THAT YOU WOULD BE BETTER OFF DEAD, OR OF HURTING YOURSELF: 2
SUM OF ALL RESPONSES TO PHQ QUESTIONS 1-9: 21
2. FEELING DOWN, DEPRESSED OR HOPELESS: 3
1. LITTLE INTEREST OR PLEASURE IN DOING THINGS: 3
6. FEELING BAD ABOUT YOURSELF - OR THAT YOU ARE A FAILURE OR HAVE LET YOURSELF OR YOUR FAMILY DOWN: 2

## 2023-03-02 NOTE — PROGRESS NOTES
Behavioral Health Consultation  All Ramirez M.A. Psychology Assistant  Clif Gregorio, Ph.D. Supervising Psychologist  3/2/2023  9:58 AM EST      Time spent with Patient: 35 minutes  This is patient's first [facility] appointment. Reason for Consult:    Chief Complaint   Patient presents with    Depression    Anxiety    Other     Mood swings       Pt provided informed consent for the behavioral health program. Discussed with patient model of service to include the limits of confidentiality (i.e. abuse reporting, suicide intervention, etc.) and short-term intervention focused approach. Reviewed provision of services under supervision of licensed psychologist and obtained written consent. Pt indicated understanding. Feedback given to PCP. S:  Patient presents with concerns about depression, anxiety; seeking diagnostic clarification re: potential bipolar disorder. Patient reports depressive symptoms, including  tearful, depressed mood, deactivation, anhedonia, poor self-worth, social isolation, decreased appetite, insomnia/hypersomnia, fatigue, and poor concentration/focus. Reports feeling overwhelmed, becoming emotional/tearful almost daily for the last month. Pt reports symptoms of anxiety, including  worry, racing thoughts, irritability, poor concentration/focus, and restlessness. Pt also reports shakiness and GI distress. Reports brief periods lasting < 24 hours with increased mood, energy. Increased energy does not last longer than one day. Denies experiencing inflated sense of self/feelings of grandiosity, decreased need for sleep. Often occurs after receiving his paycheck. Indicates engaging in impulsive spending; causing tension in relationship with partner. Sx have been present for 3 months. Sx are aggravated by weather change (allergies), isolated, overstimulation. Patient finds relief from companionship, cannabis (reports smoking consistently throughout the day).  Goals for treatment include stabilizing mood. Prescribed Abilify by PCP; picked up from pharmacy on 3/1 and intends on starting medication as prescribed. Advised on side effects; encouraged to adhere to medication plan and consult with PCP. Provided diagnostic feedback re: depression with anxiety. Agreed to monitor changes in mood swings, reassess bipolar if indicated. Patient lives with boyfriend Olive Corona, 1 dog. 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 Considering applying for a different internal position. Daily routine is regular. Daily nicotine vape use, daily cannabis use throughout the day. Denies caffeine, alcohol, illegal drug use. Patient spends 1 hours a day on social media or watching TV. There is no regular exercise; active at work. Social support is good; boyfriend, little sister. Patient enjoys the following hobbies: smoke, netflix. Patient reports difficulties falling asleep, staying asleep, waking frequently. Family history is positive for depression, anxiety, bipolar, schizophrenia, SMI (sister in [place]). Reports passive thoughts/death dying that occur weekly; reports being able to easily distract. Hospitalized for NSSI in 2019; denies current active SI, intent planning, in addition to denying current thoughts of NSSI.     O:  MSE:    Appearance: good hygiene   Attitude: cooperative and friendly  Consciousness: alert  Orientation: oriented to person, place, time, general circumstance  Memory: recent and remote memory intact  Attention/Concentration: lost train of thought while speaking  Psychomotor Activity:normal  Eye Contact: normal  Speech: normal rate and volume, well-articulated  Mood: euthymic  Affect: euthymic  Perception: within normal limits  Thought Content: within normal limits  Thought Process: logical, coherent and goal-directed  Insight: good  Judgment: intact  Ability to understand instructions: Yes  Ability to respond meaningfully: Yes  Morbid Ideation: passive thoughts of death  Suicide Assessment: suicidal ideation without plan or intent  Homicidal Ideation: no    History:    Medications:   Current Outpatient Medications   Medication Sig Dispense Refill    ARIPiprazole (ABILIFY) 5 MG tablet Take 1 tablet by mouth daily 30 tablet 0    pantoprazole (PROTONIX) 40 MG tablet Take 1 tablet by mouth 2 times daily (before meals) 60 tablet 0    sucralfate (CARAFATE) 1 GM/10ML suspension Take 10 mLs by mouth 4 times daily 1200 mL 3     No current facility-administered medications for this visit. Social History:   Social History     Socioeconomic History    Marital status: Single     Spouse name: Not on file    Number of children: Not on file    Years of education: Not on file    Highest education level: Not on file   Occupational History    Not on file   Tobacco Use    Smoking status: Former     Packs/day: 1.00     Types: Cigarettes, E-Cigarettes    Smokeless tobacco: Never   Vaping Use    Vaping Use: Every day    Substances: Nicotine, THC (sometimes)    Devices: Disposable   Substance and Sexual Activity    Alcohol use: Yes     Alcohol/week: 1.0 standard drink     Types: 1 Cans of beer per week    Drug use: Yes     Types: Marijuana Jaren Samuels)    Sexual activity: Not on file   Other Topics Concern    Not on file   Social History Narrative    Not on file     Social Determinants of Health     Financial Resource Strain: Low Risk     Difficulty of Paying Living Expenses: Not hard at all   Food Insecurity: No Food Insecurity    Worried About [address] in the Last Year: Never true    [address]  in the Last Year: Never true   Transportation Needs: Unknown    Lack of Transportation (Medical): Not on file    Lack of Transportation (Non-Medical):  No   Physical Activity: Not on file   Stress: Not on file   Social Connections: Not on file   Intimate Partner Violence: Not on file   Housing Stability: Unknown    Unable to Pay for Housing in the Last Year: Not on file    Number of Places Lived in the Last Year: Not on file    Unstable Housing in the Last Year: No     TOBACCO:   reports that he has quit smoking. His smoking use included cigarettes and e-cigarettes. He smoked an average of 1 pack per day. He has never used smokeless tobacco.  ETOH:   reports current alcohol use of about 1.0 standard drink per week. Family History:   Family History   Problem Relation Age of Onset    Cirrhosis Mother     Diabetes type 2  Mother     Crohn's Disease Mother     Depression Mother     Cirrhosis Maternal Grandfather     Diabetes type 2  Maternal Grandfather     Lung Cancer Maternal Uncle        A:  Administered PHQ-9 and SAIMA-7 (see below). Patient endorses severe symptoms of depression and severe symptoms of anxiety. Passive thoughts death/dying. Denies active SI, intent, planning. PHQ Scores 3/2/2023 2/28/2023 1/31/2023   PHQ2 Score 6 5 0   PHQ9 Score 21 21 0     Interpretation of Total Score Depression Severity: 1-4 = Minimal depression, 5-9 = Mild depression, 10-14 = Moderate depression, 15-19 = Moderately severe depression, 20-27 = Severe depression     Diagnosis:    1.  Depression with anxiety          Diagnosis Date    ADHD     Childhood asthma        Plan:  Pt interventions:  Provided education on the use of medication to treat  depression and anxiety, Provided education, Established rapport, Conducted functional assessment, [place]-setting to identify pt's [facility] / overall health, and Supportive techniques    Pt Behavioral Change Plan:   See Pt Instructions

## 2023-03-06 RX ORDER — DICYCLOMINE HCL 20 MG
20 TABLET ORAL 4 TIMES DAILY PRN
Qty: 120 TABLET | Refills: 1 | OUTPATIENT
Start: 2023-03-06 | End: 2023-04-05

## 2023-03-06 NOTE — TELEPHONE ENCOUNTER
Refill Request         Last Seen: Last Seen Department: 2/28/2023  Last Seen by PCP: 2/28/2023    Last Written: 02/05/2023      Next Appointment:   Future Appointments   Date Time Provider Deepak Hand   3/23/2023  9:00 AM Christie Alfonso [facility]   3/28/2023  8:00 AM DO yudith Garcia - [facility]             Requested Prescriptions     Pending Prescriptions Disp Refills    dicyclomine (BENTYL) 20 MG tablet 120 tablet 1     Sig: Take 1 tablet by mouth 4 times daily as needed Take 20 mg by mouth 4 times daily as needed

## 2023-03-09 ENCOUNTER — TELEPHONE (OUTPATIENT)
Dept: FAMILY MEDICINE CLINIC | Age: 23
End: 2023-03-09

## 2023-03-09 RX ORDER — DICYCLOMINE HCL 20 MG
20 TABLET ORAL 4 TIMES DAILY
Qty: 120 TABLET | Refills: 0 | Status: SHIPPED | OUTPATIENT
Start: 2023-03-09 | End: 2023-04-08

## 2023-03-09 NOTE — TELEPHONE ENCOUNTER
Patient called requesting a med refill on his Dicyclomine 20mg. He stated that he only has one left and this helps with his abdominal pain.     Monika Castelan [phone] (home)    is requesting refill(s) of medication Dicyclomine 20mg to preferred pharmacy [address] 2/28/23 (pertaining to medication)   Last refill 02/05/2023 (per medication requested)  Next office visit scheduled or attempted Yes  Date 03/28/2023

## 2023-03-23 ENCOUNTER — OFFICE VISIT (OUTPATIENT)
Dept: FAMILY MEDICINE CLINIC | Age: 23
End: 2023-03-23
Payer: MEDICAID

## 2023-03-23 VITALS
HEART RATE: 113 BPM | BODY MASS INDEX: 17.43 KG/M2 | HEIGHT: 75 IN | DIASTOLIC BLOOD PRESSURE: 74 MMHG | WEIGHT: 140.2 LBS | OXYGEN SATURATION: 100 % | SYSTOLIC BLOOD PRESSURE: 114 MMHG

## 2023-03-23 DIAGNOSIS — F31.4 BIPOLAR DISORDER, CURRENT EPISODE DEPRESSED, SEVERE, WITHOUT PSYCHOTIC FEATURES (HCC): Primary | ICD-10-CM

## 2023-03-23 DIAGNOSIS — J30.2 SEASONAL ALLERGIES: ICD-10-CM

## 2023-03-23 PROCEDURE — 99214 OFFICE O/P EST MOD 30 MIN: CPT | Performed by: STUDENT IN AN ORGANIZED HEALTH CARE EDUCATION/TRAINING PROGRAM

## 2023-03-23 RX ORDER — LAMOTRIGINE 25 MG/1
TABLET ORAL
Qty: 120 TABLET | Refills: 0 | Status: SHIPPED | OUTPATIENT
Start: 2023-03-23

## 2023-03-23 RX ORDER — CETIRIZINE HYDROCHLORIDE 10 MG/1
10 TABLET ORAL DAILY
Qty: 30 TABLET | Refills: 0 | Status: SHIPPED | OUTPATIENT
Start: 2023-03-23 | End: 2023-04-22

## 2023-03-23 ASSESSMENT — PATIENT HEALTH QUESTIONNAIRE - PHQ9
2. FEELING DOWN, DEPRESSED OR HOPELESS: 3
10. IF YOU CHECKED OFF ANY PROBLEMS, HOW DIFFICULT HAVE THESE PROBLEMS MADE IT FOR YOU TO DO YOUR WORK, TAKE CARE OF THINGS AT HOME, OR GET ALONG WITH OTHER PEOPLE: 2
SUM OF ALL RESPONSES TO PHQ9 QUESTIONS 1 & 2: 6
7. TROUBLE CONCENTRATING ON THINGS, SUCH AS READING THE NEWSPAPER OR WATCHING TELEVISION: 3
9. THOUGHTS THAT YOU WOULD BE BETTER OFF DEAD, OR OF HURTING YOURSELF: 1
6. FEELING BAD ABOUT YOURSELF - OR THAT YOU ARE A FAILURE OR HAVE LET YOURSELF OR YOUR FAMILY DOWN: 1
SUM OF ALL RESPONSES TO PHQ QUESTIONS 1-9: 20
8. MOVING OR SPEAKING SO SLOWLY THAT OTHER PEOPLE COULD HAVE NOTICED. OR THE OPPOSITE, BEING SO FIGETY OR RESTLESS THAT YOU HAVE BEEN MOVING AROUND A LOT MORE THAN USUAL: 3
1. LITTLE INTEREST OR PLEASURE IN DOING THINGS: 3
SUM OF ALL RESPONSES TO PHQ QUESTIONS 1-9: 20
SUM OF ALL RESPONSES TO PHQ QUESTIONS 1-9: 19
SUM OF ALL RESPONSES TO PHQ QUESTIONS 1-9: 20
5. POOR APPETITE OR OVEREATING: 3
3. TROUBLE FALLING OR STAYING ASLEEP: 0
4. FEELING TIRED OR HAVING LITTLE ENERGY: 3

## 2023-03-23 ASSESSMENT — COLUMBIA-SUICIDE SEVERITY RATING SCALE - C-SSRS
6. HAVE YOU EVER DONE ANYTHING, STARTED TO DO ANYTHING, OR PREPARED TO DO ANYTHING TO END YOUR LIFE?: NO
1. WITHIN THE PAST MONTH, HAVE YOU WISHED YOU WERE DEAD OR WISHED YOU COULD GO TO SLEEP AND NOT WAKE UP?: YES
2. HAVE YOU ACTUALLY HAD ANY THOUGHTS OF KILLING YOURSELF?: NO

## 2023-03-23 ASSESSMENT — ENCOUNTER SYMPTOMS
COUGH: 1
SHORTNESS OF BREATH: 0
RHINORRHEA: 0

## 2023-03-23 NOTE — PROGRESS NOTES
facility-administered medications on file prior to visit. Past Medical History:   Diagnosis Date    ADHD     Childhood asthma      There is no problem list on file for this patient. PE  Vitals:    03/23/23 0828   BP: 114/74   Site: Left Upper Arm   Position: Sitting   Pulse: (!) 113   SpO2: 100%   Weight: 140 lb 3.2 oz (63.6 kg)   Height: 6' 2.7\" (1.897 m)     Estimated body mass index is 17.66 kg/m² as calculated from the following:    Height as of this encounter: 6' 2.7\" (1.897 m). Weight as of this encounter: 140 lb 3.2 oz (63.6 kg). Physical Exam  Constitutional:       General: He is not in acute distress. Appearance: Normal appearance. He is normal weight. HENT:      Head: Normocephalic and atraumatic. Right Ear: External ear normal.      Left Ear: External ear normal.      Nose: No rhinorrhea. Eyes:      Extraocular Movements: Extraocular movements intact. Conjunctiva/sclera: Conjunctivae normal.      Pupils: Pupils are equal, round, and reactive to light. Cardiovascular:      Rate and Rhythm: Normal rate and regular rhythm. Heart sounds: Normal heart sounds. No murmur heard. No friction rub. No gallop. Pulmonary:      Effort: Pulmonary effort is normal.      Breath sounds: Normal breath sounds. Neurological:      General: No focal deficit present. Mental Status: He is alert. Psychiatric:         Mood and Affect: Mood normal.       Richie Browning DO    This dictation was generated by voice recognition computer software. Although all attempts are made to edit the dictation for accuracy, there may be errors in the transcription that are not intended.

## 2023-04-13 ENCOUNTER — TELEPHONE (OUTPATIENT)
Dept: FAMILY MEDICINE CLINIC | Age: 23
End: 2023-04-13

## 2023-05-01 ENCOUNTER — OFFICE VISIT (OUTPATIENT)
Dept: FAMILY MEDICINE CLINIC | Age: 23
End: 2023-05-01
Payer: MEDICAID

## 2023-05-01 VITALS
HEART RATE: 86 BPM | DIASTOLIC BLOOD PRESSURE: 74 MMHG | OXYGEN SATURATION: 97 % | WEIGHT: 135 LBS | SYSTOLIC BLOOD PRESSURE: 116 MMHG | BODY MASS INDEX: 17.01 KG/M2

## 2023-05-01 DIAGNOSIS — J02.9 SORE THROAT: ICD-10-CM

## 2023-05-01 DIAGNOSIS — R05.9 COUGH, UNSPECIFIED TYPE: ICD-10-CM

## 2023-05-01 DIAGNOSIS — J06.9 VIRAL URI: Primary | ICD-10-CM

## 2023-05-01 LAB
Lab: NORMAL
PERFORMING INSTRUMENT: NORMAL
QC PASS/FAIL: NORMAL
S PYO AG THROAT QL: NORMAL
SARS-COV-2, POC: NORMAL

## 2023-05-01 PROCEDURE — 99213 OFFICE O/P EST LOW 20 MIN: CPT | Performed by: STUDENT IN AN ORGANIZED HEALTH CARE EDUCATION/TRAINING PROGRAM

## 2023-05-01 PROCEDURE — 87426 SARSCOV CORONAVIRUS AG IA: CPT | Performed by: STUDENT IN AN ORGANIZED HEALTH CARE EDUCATION/TRAINING PROGRAM

## 2023-05-01 PROCEDURE — 87880 STREP A ASSAY W/OPTIC: CPT | Performed by: STUDENT IN AN ORGANIZED HEALTH CARE EDUCATION/TRAINING PROGRAM

## 2023-05-01 ASSESSMENT — ENCOUNTER SYMPTOMS
COUGH: 1
SHORTNESS OF BREATH: 0
DIARRHEA: 0
VOMITING: 1
NAUSEA: 1
SINUS PRESSURE: 1
RHINORRHEA: 1
SORE THROAT: 1
BLOOD IN STOOL: 0

## 2023-05-01 NOTE — PROGRESS NOTES
[facility]  2023    Darcy Obando (:  2000) is a 26 y.o. male, here for evaluation of the following medical concerns:    Chief Complaint   Patient presents with    Migraine     Has had migraine since Friday. Pharyngitis     Has had sore throat, cough and nasal drainage since Friday         ASSESSMENT/ PLAN  1. Viral URI  Continue supportive care and over-the-counter treatment. Can trial Mucinex and Afrin. If using Afrin do not use more than 3 days without a month break in between. COVID and strep were negative today. Call for new or worsening symptoms. Work note provided. - POCT COVID-19, Antigen    2. Cough, unspecified type  See plan above  - POCT COVID-19, Antigen    3. Sore throat  See plan above  - POCT rapid strep A  - POCT COVID-19, Antigen       No follow-ups on file. HPI  Symptoms started Friday, cough, runny nose, sore throat, headaches. T max was 100 this morning. Took [employer] brand cold and flu which is somewhat alleviating. It takes the headaches away for a little but then goes away. No anosmia, dysgeusia. Has had some nausea, vomiting. No diarrhea, no melena or bright red blood per rectum. No sick contacts. He denies any shortness of breath. His cough is nonproductive. He has not taken a home COVID test.    ROS  Review of Systems   Constitutional:  Negative for chills and fever. HENT:  Positive for ear pain, rhinorrhea, sinus pressure and sore throat. Respiratory:  Positive for cough. Negative for shortness of breath. Gastrointestinal:  Positive for nausea and vomiting. Negative for blood in stool and diarrhea.      HISTORIES  Current Outpatient Medications on File Prior to Visit   Medication Sig Dispense Refill    lamoTRIgine (LAMICTAL) 100 MG tablet Take 0.5 tablets by mouth daily 15 tablet 0    venlafaxine (EFFEXOR XR) 37.5 MG extended release capsule Take 1 capsule by mouth daily 30 capsule 0    pantoprazole (PROTONIX) 40 MG tablet Take

## 2023-05-15 ENCOUNTER — OFFICE VISIT (OUTPATIENT)
Dept: FAMILY MEDICINE CLINIC | Age: 23
End: 2023-05-15
Payer: MEDICAID

## 2023-05-15 VITALS
HEIGHT: 75 IN | DIASTOLIC BLOOD PRESSURE: 70 MMHG | HEART RATE: 68 BPM | OXYGEN SATURATION: 95 % | SYSTOLIC BLOOD PRESSURE: 108 MMHG | WEIGHT: 134 LBS | BODY MASS INDEX: 16.66 KG/M2

## 2023-05-15 DIAGNOSIS — F31.4 BIPOLAR DISORDER, CURRENT EPISODE DEPRESSED, SEVERE, WITHOUT PSYCHOTIC FEATURES (HCC): Primary | ICD-10-CM

## 2023-05-15 PROCEDURE — 99213 OFFICE O/P EST LOW 20 MIN: CPT | Performed by: STUDENT IN AN ORGANIZED HEALTH CARE EDUCATION/TRAINING PROGRAM

## 2023-05-15 RX ORDER — LAMOTRIGINE 100 MG/1
50 TABLET ORAL DAILY
Qty: 15 TABLET | Refills: 2 | Status: SHIPPED | OUTPATIENT
Start: 2023-05-15

## 2023-05-15 RX ORDER — VENLAFAXINE HYDROCHLORIDE 75 MG/1
75 CAPSULE, EXTENDED RELEASE ORAL DAILY
Qty: 30 CAPSULE | Refills: 0 | Status: SHIPPED | OUTPATIENT
Start: 2023-05-15

## 2023-05-15 RX ORDER — FOLIC ACID 1 MG/1
1 TABLET ORAL DAILY
Qty: 90 TABLET | Refills: 1 | Status: SHIPPED | OUTPATIENT
Start: 2023-05-15

## 2023-05-15 ASSESSMENT — COLUMBIA-SUICIDE SEVERITY RATING SCALE - C-SSRS
2. HAVE YOU ACTUALLY HAD ANY THOUGHTS OF KILLING YOURSELF?: NO
1. WITHIN THE PAST MONTH, HAVE YOU WISHED YOU WERE DEAD OR WISHED YOU COULD GO TO SLEEP AND NOT WAKE UP?: YES
6. HAVE YOU EVER DONE ANYTHING, STARTED TO DO ANYTHING, OR PREPARED TO DO ANYTHING TO END YOUR LIFE?: NO

## 2023-05-15 ASSESSMENT — PATIENT HEALTH QUESTIONNAIRE - PHQ9
3. TROUBLE FALLING OR STAYING ASLEEP: 3
8. MOVING OR SPEAKING SO SLOWLY THAT OTHER PEOPLE COULD HAVE NOTICED. OR THE OPPOSITE, BEING SO FIGETY OR RESTLESS THAT YOU HAVE BEEN MOVING AROUND A LOT MORE THAN USUAL: 3
6. FEELING BAD ABOUT YOURSELF - OR THAT YOU ARE A FAILURE OR HAVE LET YOURSELF OR YOUR FAMILY DOWN: 0
4. FEELING TIRED OR HAVING LITTLE ENERGY: 3
SUM OF ALL RESPONSES TO PHQ QUESTIONS 1-9: 20
SUM OF ALL RESPONSES TO PHQ QUESTIONS 1-9: 20
SUM OF ALL RESPONSES TO PHQ9 QUESTIONS 1 & 2: 5
7. TROUBLE CONCENTRATING ON THINGS, SUCH AS READING THE NEWSPAPER OR WATCHING TELEVISION: 3
5. POOR APPETITE OR OVEREATING: 3
SUM OF ALL RESPONSES TO PHQ QUESTIONS 1-9: 20
9. THOUGHTS THAT YOU WOULD BE BETTER OFF DEAD, OR OF HURTING YOURSELF: 0
1. LITTLE INTEREST OR PLEASURE IN DOING THINGS: 3
SUM OF ALL RESPONSES TO PHQ QUESTIONS 1-9: 20
2. FEELING DOWN, DEPRESSED OR HOPELESS: 2
10. IF YOU CHECKED OFF ANY PROBLEMS, HOW DIFFICULT HAVE THESE PROBLEMS MADE IT FOR YOU TO DO YOUR WORK, TAKE CARE OF THINGS AT HOME, OR GET ALONG WITH OTHER PEOPLE: 3

## 2023-05-15 ASSESSMENT — ENCOUNTER SYMPTOMS
RHINORRHEA: 0
COUGH: 0
SHORTNESS OF BREATH: 0

## 2023-05-15 NOTE — PROGRESS NOTES
[facility]  5/15/2023    Seferino Mcnulty (:  2000) is a 26 y.o. male, here for evaluation of the following medical concerns:    Chief Complaint   Patient presents with    Follow-up     Notes mood has been all over the place        ASSESSMENT/ PLAN  1. Bipolar disorder, current episode depressed, severe, without psychotic features ([address]  Increase cardiovascular exercise to 20 minutes daily as this will help with endorphin release. Trial a yoga program as patient cannot focus during meditation. Continue Lamictal and increase Effexor. Follow-up for new or worsening symptoms. Return in about 1 month (around 6/15/2023). HPI  Patient has been running for 15 minutes a day. He has not tried meditation or yoga. He did not like counseling. He has thoughts of being better off dead but denies any suicidal ideation or homicidal ideation. He does feel his mood overall has improved since starting treatment. He has had increased stress over the last few weeks as he lost his license and had to get [employer] insurance for his car which he can barely afford. His court issues will be done in about a week and he feels like his mood will improve at that time. ROS  Review of Systems   Constitutional:  Negative for chills and fever. HENT:  Negative for rhinorrhea. Respiratory:  Negative for cough and shortness of breath. Psychiatric/Behavioral:  Positive for dysphoric mood. Negative for suicidal ideas. The patient is nervous/anxious. HISTORIES  Current Outpatient Medications on File Prior to Visit   Medication Sig Dispense Refill    dicyclomine (BENTYL) 20 MG tablet Take 1 tablet by mouth 4 times daily 120 tablet 0    pantoprazole (PROTONIX) 40 MG tablet Take 1 tablet by mouth 2 times daily (before meals) 60 tablet 0    sucralfate (CARAFATE) 1 GM/10ML suspension Take 10 mLs by mouth 4 times daily 1200 mL 3     No current facility-administered medications on file prior to visit.

## 2023-07-21 ENCOUNTER — HOSPITAL ENCOUNTER (OUTPATIENT)
Dept: GENERAL RADIOLOGY | Age: 23
Discharge: HOME | End: 2023-07-21
Payer: MEDICAID

## 2023-07-21 ENCOUNTER — HOSPITAL ENCOUNTER (OUTPATIENT)
Age: 23
Discharge: HOME | End: 2023-07-21
Payer: MEDICAID

## 2023-07-21 DIAGNOSIS — M50.13 CERVICAL DISC DISORDER WITH RADICULOPATHY OF CERVICOTHORACIC REGION: ICD-10-CM

## 2023-07-21 DIAGNOSIS — M62.830 BACK MUSCLE SPASM: ICD-10-CM

## 2023-07-21 PROCEDURE — 72050 X-RAY EXAM NECK SPINE 4/5VWS: CPT

## 2023-07-21 PROCEDURE — 72072 X-RAY EXAM THORAC SPINE 3VWS: CPT

## 2023-07-21 PROCEDURE — 72110 X-RAY EXAM L-2 SPINE 4/>VWS: CPT
